# Patient Record
Sex: MALE | Race: WHITE | NOT HISPANIC OR LATINO | Employment: FULL TIME | URBAN - NONMETROPOLITAN AREA
[De-identification: names, ages, dates, MRNs, and addresses within clinical notes are randomized per-mention and may not be internally consistent; named-entity substitution may affect disease eponyms.]

---

## 2019-01-14 ENCOUNTER — TRANSCRIBE ORDERS (OUTPATIENT)
Dept: LAB | Facility: CLINIC | Age: 49
End: 2019-01-14

## 2019-01-14 ENCOUNTER — APPOINTMENT (OUTPATIENT)
Dept: LAB | Facility: CLINIC | Age: 49
End: 2019-01-14
Payer: COMMERCIAL

## 2019-01-14 DIAGNOSIS — M25.50 ARTHRALGIA, UNSPECIFIED JOINT: ICD-10-CM

## 2019-01-14 DIAGNOSIS — I49.9 CARDIAC ARRHYTHMIA, UNSPECIFIED CARDIAC ARRHYTHMIA TYPE: Primary | ICD-10-CM

## 2019-01-14 DIAGNOSIS — I49.9 CARDIAC ARRHYTHMIA, UNSPECIFIED CARDIAC ARRHYTHMIA TYPE: ICD-10-CM

## 2019-01-14 DIAGNOSIS — E13.8 DIABETES MELLITUS OF OTHER TYPE WITH COMPLICATION, UNSPECIFIED WHETHER LONG TERM INSULIN USE: ICD-10-CM

## 2019-01-14 DIAGNOSIS — I10 HYPERTENSION, UNSPECIFIED TYPE: ICD-10-CM

## 2019-01-14 LAB
ALBUMIN SERPL BCP-MCNC: 3.8 G/DL (ref 3.5–5)
ALP SERPL-CCNC: 115 U/L (ref 46–116)
ALT SERPL W P-5'-P-CCNC: 124 U/L (ref 12–78)
ANION GAP SERPL CALCULATED.3IONS-SCNC: 6 MMOL/L (ref 4–13)
AST SERPL W P-5'-P-CCNC: 72 U/L (ref 5–45)
BILIRUB SERPL-MCNC: 0.67 MG/DL (ref 0.2–1)
BUN SERPL-MCNC: 14 MG/DL (ref 5–25)
CALCIUM SERPL-MCNC: 8.7 MG/DL (ref 8.3–10.1)
CHLORIDE SERPL-SCNC: 101 MMOL/L (ref 100–108)
CHOLEST SERPL-MCNC: 157 MG/DL (ref 50–200)
CO2 SERPL-SCNC: 28 MMOL/L (ref 21–32)
CREAT SERPL-MCNC: 0.88 MG/DL (ref 0.6–1.3)
ERYTHROCYTE [DISTWIDTH] IN BLOOD BY AUTOMATED COUNT: 13.3 % (ref 11.6–15.1)
EST. AVERAGE GLUCOSE BLD GHB EST-MCNC: 220 MG/DL
GFR SERPL CREATININE-BSD FRML MDRD: 102 ML/MIN/1.73SQ M
GLUCOSE P FAST SERPL-MCNC: 186 MG/DL (ref 65–99)
HBA1C MFR BLD: 9.3 % (ref 4.2–6.3)
HCT VFR BLD AUTO: 52.2 % (ref 36.5–49.3)
HDLC SERPL-MCNC: 22 MG/DL (ref 40–60)
HGB BLD-MCNC: 17 G/DL (ref 12–17)
LDLC SERPL CALC-MCNC: 98 MG/DL (ref 0–100)
MCH RBC QN AUTO: 30.5 PG (ref 26.8–34.3)
MCHC RBC AUTO-ENTMCNC: 32.6 G/DL (ref 31.4–37.4)
MCV RBC AUTO: 94 FL (ref 82–98)
NONHDLC SERPL-MCNC: 135 MG/DL
PLATELET # BLD AUTO: 121 THOUSANDS/UL (ref 149–390)
PMV BLD AUTO: 13.9 FL (ref 8.9–12.7)
POTASSIUM SERPL-SCNC: 4 MMOL/L (ref 3.5–5.3)
PROT SERPL-MCNC: 7.7 G/DL (ref 6.4–8.2)
RBC # BLD AUTO: 5.57 MILLION/UL (ref 3.88–5.62)
SODIUM SERPL-SCNC: 135 MMOL/L (ref 136–145)
T4 SERPL-MCNC: 7.6 UG/DL (ref 4.7–13.3)
TRIGL SERPL-MCNC: 185 MG/DL
TSH SERPL DL<=0.05 MIU/L-ACNC: 6.57 UIU/ML (ref 0.36–3.74)
WBC # BLD AUTO: 9.33 THOUSAND/UL (ref 4.31–10.16)

## 2019-01-14 PROCEDURE — 80061 LIPID PANEL: CPT

## 2019-01-14 PROCEDURE — 84443 ASSAY THYROID STIM HORMONE: CPT

## 2019-01-14 PROCEDURE — 84436 ASSAY OF TOTAL THYROXINE: CPT

## 2019-01-14 PROCEDURE — 36415 COLL VENOUS BLD VENIPUNCTURE: CPT

## 2019-01-14 PROCEDURE — 83036 HEMOGLOBIN GLYCOSYLATED A1C: CPT

## 2019-01-14 PROCEDURE — 80053 COMPREHEN METABOLIC PANEL: CPT

## 2019-01-14 PROCEDURE — 85027 COMPLETE CBC AUTOMATED: CPT

## 2019-03-26 ENCOUNTER — APPOINTMENT (OUTPATIENT)
Dept: RADIOLOGY | Facility: CLINIC | Age: 49
End: 2019-03-26
Payer: COMMERCIAL

## 2019-03-26 ENCOUNTER — OFFICE VISIT (OUTPATIENT)
Dept: URGENT CARE | Facility: CLINIC | Age: 49
End: 2019-03-26
Payer: COMMERCIAL

## 2019-03-26 VITALS
WEIGHT: 315 LBS | DIASTOLIC BLOOD PRESSURE: 68 MMHG | BODY MASS INDEX: 44.1 KG/M2 | TEMPERATURE: 100.6 F | SYSTOLIC BLOOD PRESSURE: 141 MMHG | HEIGHT: 71 IN | OXYGEN SATURATION: 96 % | RESPIRATION RATE: 16 BRPM | HEART RATE: 91 BPM

## 2019-03-26 DIAGNOSIS — R05.9 COUGH: ICD-10-CM

## 2019-03-26 DIAGNOSIS — R50.9 FEVER, UNSPECIFIED FEVER CAUSE: ICD-10-CM

## 2019-03-26 DIAGNOSIS — R06.2 WHEEZING: ICD-10-CM

## 2019-03-26 DIAGNOSIS — R68.89 FLU-LIKE SYMPTOMS: Primary | ICD-10-CM

## 2019-03-26 LAB — S PYO AG THROAT QL: NEGATIVE

## 2019-03-26 PROCEDURE — 87070 CULTURE OTHR SPECIMN AEROBIC: CPT | Performed by: PHYSICIAN ASSISTANT

## 2019-03-26 PROCEDURE — 71046 X-RAY EXAM CHEST 2 VIEWS: CPT

## 2019-03-26 PROCEDURE — 94640 AIRWAY INHALATION TREATMENT: CPT | Performed by: PHYSICIAN ASSISTANT

## 2019-03-26 PROCEDURE — 99214 OFFICE O/P EST MOD 30 MIN: CPT | Performed by: PHYSICIAN ASSISTANT

## 2019-03-26 RX ORDER — FUROSEMIDE 80 MG
80 TABLET ORAL DAILY
Refills: 5 | COMMUNITY
Start: 2019-03-02

## 2019-03-26 RX ORDER — GLIPIZIDE 2.5 MG/1
5 TABLET, EXTENDED RELEASE ORAL
Refills: 1 | COMMUNITY
Start: 2019-03-14

## 2019-03-26 RX ORDER — GLIPIZIDE 2.5 MG/1
2.5 TABLET, EXTENDED RELEASE ORAL
Refills: 3 | COMMUNITY
Start: 2019-03-20

## 2019-03-26 RX ORDER — OSELTAMIVIR PHOSPHATE 75 MG/1
75 CAPSULE ORAL 2 TIMES DAILY
Qty: 10 CAPSULE | Refills: 0 | Status: SHIPPED | OUTPATIENT
Start: 2019-03-26 | End: 2019-04-04 | Stop reason: HOSPADM

## 2019-03-26 RX ORDER — CANAGLIFLOZIN 300 MG/1
TABLET, FILM COATED ORAL
Refills: 5 | COMMUNITY
Start: 2019-03-19

## 2019-03-26 RX ORDER — SITAGLIPTIN AND METFORMIN HYDROCHLORIDE 1000; 50 MG/1; MG/1
TABLET, FILM COATED, EXTENDED RELEASE ORAL
Refills: 5 | COMMUNITY
Start: 2019-03-19

## 2019-03-26 RX ORDER — IPRATROPIUM BROMIDE AND ALBUTEROL SULFATE 2.5; .5 MG/3ML; MG/3ML
3 SOLUTION RESPIRATORY (INHALATION) ONCE
Status: COMPLETED | OUTPATIENT
Start: 2019-03-26 | End: 2019-03-26

## 2019-03-26 RX ORDER — ATORVASTATIN CALCIUM 20 MG/1
20 TABLET, FILM COATED ORAL DAILY
Refills: 3 | COMMUNITY
Start: 2019-03-19

## 2019-03-26 RX ORDER — IRBESARTAN 300 MG/1
TABLET ORAL
COMMUNITY
Start: 2019-03-19

## 2019-03-26 RX ORDER — PREDNISONE 10 MG/1
TABLET ORAL
Qty: 26 TABLET | Refills: 0 | Status: SHIPPED | OUTPATIENT
Start: 2019-03-26 | End: 2019-04-04 | Stop reason: HOSPADM

## 2019-03-26 RX ADMIN — IPRATROPIUM BROMIDE AND ALBUTEROL SULFATE 3 ML: 2.5; .5 SOLUTION RESPIRATORY (INHALATION) at 19:36

## 2019-03-26 NOTE — PROGRESS NOTES
[address] [facility]    NAME: Umu Rios is a 50 y o  male  : 1970    MRN: [id number]  DATE: 2019  TIME: 7:13 PM    Assessment and Plan   Flu-like symptoms [R68 89]  1  Flu-like symptoms  oseltamivir (TAMIFLU) 75 mg capsule   2  Cough  XR chest pa & lateral    ipratropium-albuterol (DUO-NEB) 0 5-2 5 mg/3 mL inhalation solution 3 mL   3  Fever, unspecified fever cause  POCT rapid strepA    Throat culture   4  Wheezing  predniSONE 10 mg tablet       Patient Instructions     Patient Instructions   Symptoms are flu-like  Will treat with Tamiflu  Take prednisone for wheezing  Take as directed  Ibuprofen and/or Tylenol to help with fever and body aches  If Fever is lasting longer than 4-5 days, get recheck by her PCP  If you worsening shortness of breath go to the emergency room  Infection appears viral   Recommend symptomatic treatment  Over the counter cough and cold medications to help with symptoms  Use salt water gargles for sore throat and throat lozenges  Cough drops as needed  Wash hands frequently to prevent the spread of infection  If not improving over the next 7-10 days, follow up with PCP  Symptoms may persist for 10-14 days  Patient advised to monitor blood sugars  Chief Complaint     Chief Complaint   Patient presents with    Flu Symptoms     x 1 day       History of Present Illness   26-year-old male here with flu-like symptoms since yesterday  Has a high fever  Slight cough and some wheezing  Some mild nasal congestion  Very tired and has not gotten out of bed  Review of Systems   Review of Systems   Constitutional: Positive for chills, fatigue and fever  Negative for activity change, appetite change, diaphoresis and unexpected weight change  HENT: Positive for congestion  Negative for ear pain, hearing loss, sinus pressure, sneezing, sore throat and tinnitus  Eyes: Negative for photophobia, redness and visual disturbance     Respiratory: Positive for cough and wheezing  Negative for apnea, chest tightness, shortness of breath and stridor  Cardiovascular: Negative for chest pain, palpitations and leg swelling  Gastrointestinal: Negative for abdominal distention, abdominal pain, blood in stool, constipation, diarrhea, nausea and vomiting  Endocrine: Negative for cold intolerance, heat intolerance, polydipsia, polyphagia and polyuria  Genitourinary: Negative for difficulty urinating, dysuria, flank pain, hematuria and urgency  Musculoskeletal: Positive for myalgias  Negative for arthralgias, back pain, gait problem, neck pain and neck stiffness  Skin: Negative for rash and wound  Neurological: Negative for dizziness, tremors, seizures, syncope, weakness, light-headedness, numbness and headaches  Hematological: Negative for adenopathy  Does not bruise/bleed easily  Psychiatric/Behavioral: Negative for agitation, behavioral problems, confusion and decreased concentration  The patient is not nervous/anxious  All other systems reviewed and are negative  Current Medications     Current Outpatient Medications:     atorvastatin (LIPITOR) 20 mg tablet, Take 20 mg by mouth daily, Disp: , Rfl: 3    furosemide (LASIX) 80 mg tablet, Take 80 mg by mouth daily, Disp: , Rfl: 5    glipiZIDE (GLUCOTROL XL) 2 5 mg 24 hr tablet, Take 2 5 mg by mouth daily with breakfast, Disp: , Rfl: 1    glipiZIDE (GLUCOTROL XL) 5 mg 24 hr tablet, Take 5 mg by mouth daily with breakfast, Disp: , Rfl: 3    INVOKANA 300 MG TABS, TAKE 1 TABLET BY ORAL ROUTE EVERY DAY BEFORE THE FIRST MEAL OF THE DAY, Disp: , Rfl: 5    irbesartan (AVAPRO) 300 mg tablet, , Disp: , Rfl:     JANUMET XR  MG TB24, TAKE 2 TABLETS BY MOUTH DAILY WITH EVENING MEAL   SWALLOW WHOLE  DO NOT CRUSH,CHEW AND / OR DIVIDE, Disp: , Rfl: 5    sertraline (ZOLOFT) 50 mg tablet, Take 50 mg by mouth daily, Disp: , Rfl: 3    oseltamivir (TAMIFLU) 75 mg capsule, Take 1 capsule (75 mg total) by mouth 2 (two) times a day for 5 days, Disp: 10 capsule, Rfl: 0    predniSONE 10 mg tablet, Take 3 tabs BID X 2 days, 2 tabs BID X 2 days, 1 tab BID X 2 days, 1 tab daily X 2 days, Disp: 26 tablet, Rfl: 0    Current Facility-Administered Medications:     ipratropium-albuterol (DUO-NEB) 0 5-2 5 mg/3 mL inhalation solution 3 mL, 3 mL, Nebulization, Once, Nallely Norwood PA-C    Current Allergies     Allergies as of 03/26/2019 - Reviewed 03/26/2019   Allergen Reaction Noted    Penicillins  03/26/2019          The following portions of the patient's history were reviewed and updated as appropriate: allergies, current medications, past family history, past medical history, past social history, past surgical history and problem list    Past Medical History:   Diagnosis Date    Anxiety     Depression     Diabetes mellitus ([facility] [address] )     Hyperlipidemia     Hypertension      History reviewed  No pertinent surgical history  History reviewed  No pertinent family history    Social History     Socioeconomic History    Marital status: /Civil Union     Spouse name: Not on file    Number of children: Not on file    Years of education: Not on file    Highest education level: Not on file   Occupational History    Not on file   Social Needs    Financial resource strain: Not on file    Food insecurity:     Worry: Not on file     Inability: Not on file    Transportation needs:     Medical: Not on file     Non-medical: Not on file   Tobacco Use    Smoking status: Not on file   Substance and Sexual Activity    Alcohol use: Not on file    Drug use: Not on file    Sexual activity: Not on file   Lifestyle    Physical activity:     Days per week: Not on file     Minutes per session: Not on file    Stress: Not on file   Relationships    Social connections:     Talks on phone: Not on file     Gets together: Not on file     Attends Hindu service: Not on file     Active member of club or organization: Not on file     Attends meetings of clubs or organizations: Not on file     Relationship status: Not on file    Intimate partner violence:     Fear of current or ex partner: Not on file     Emotionally abused: Not on file     Physically abused: Not on file     Forced sexual activity: Not on file   Other Topics Concern    Not on file   Social History Narrative    Not on file     Medications have been verified  Objective   /68   Pulse 91   Temp (!) 100 6 °F (38 1 °C)   Resp 16   Ht 5' 11" (1 803 m)   Wt (!) 150 kg (330 lb)   SpO2 96%   BMI 46 03 kg/m²      Physical Exam   Physical Exam   Constitutional: He appears well-developed and well-nourished  No distress  HENT:   Head: Normocephalic  Right Ear: Tympanic membrane and external ear normal    Left Ear: Tympanic membrane and external ear normal    Nose: Mucosal edema present  Mouth/Throat: Posterior oropharyngeal erythema present  No oropharyngeal exudate  Neck: Normal range of motion  Neck supple  Cardiovascular: Normal rate, regular rhythm and normal heart sounds  No murmur heard  Pulmonary/Chest: Effort normal  No respiratory distress  He has wheezes  He has no rhonchi  He has no rales  Abdominal: Soft  Bowel sounds are normal  There is no tenderness  Musculoskeletal: Normal range of motion  Lymphadenopathy:     He has no cervical adenopathy  Skin: Skin is warm  No rash noted  Nursing note and vitals reviewed

## 2019-03-29 LAB — BACTERIA THROAT CULT: NORMAL

## 2019-03-30 ENCOUNTER — APPOINTMENT (EMERGENCY)
Dept: RADIOLOGY | Facility: HOSPITAL | Age: 49
DRG: 872 | End: 2019-03-30
Payer: COMMERCIAL

## 2019-03-30 ENCOUNTER — HOSPITAL ENCOUNTER (INPATIENT)
Facility: HOSPITAL | Age: 49
LOS: 5 days | Discharge: HOME | DRG: 872 | End: 2019-04-04
Attending: EMERGENCY MEDICINE | Admitting: HOSPITALIST
Payer: COMMERCIAL

## 2019-03-30 DIAGNOSIS — E11.65 HYPERGLYCEMIA DUE TO TYPE 2 DIABETES MELLITUS (HCC): ICD-10-CM

## 2019-03-30 DIAGNOSIS — L03.115 CELLULITIS OF RIGHT LOWER EXTREMITY: ICD-10-CM

## 2019-03-30 DIAGNOSIS — Z72.0 TOBACCO ABUSE: Chronic | ICD-10-CM

## 2019-03-30 DIAGNOSIS — A41.9 SEPSIS AFFECTING SKIN: Primary | ICD-10-CM

## 2019-03-30 DIAGNOSIS — E87.1 HYPONATREMIA: ICD-10-CM

## 2019-03-30 DIAGNOSIS — L03.115 CELLULITIS OF LEG, RIGHT: ICD-10-CM

## 2019-03-30 DIAGNOSIS — R78.81 GRAM-POSITIVE BACTEREMIA: ICD-10-CM

## 2019-03-30 DIAGNOSIS — A41.9 SEPSIS, DUE TO UNSPECIFIED ORGANISM: ICD-10-CM

## 2019-03-30 PROBLEM — E11.8 TYPE 2 DIABETES MELLITUS WITH COMPLICATION, WITHOUT LONG-TERM CURRENT USE OF INSULIN (HCC): Chronic | Status: ACTIVE | Noted: 2019-03-30

## 2019-03-30 PROBLEM — E66.01 MORBID OBESITY WITH BMI OF 40.0-44.9, ADULT (HCC): Chronic | Status: ACTIVE | Noted: 2019-03-30

## 2019-03-30 PROBLEM — I10 ESSENTIAL HYPERTENSION: Chronic | Status: ACTIVE | Noted: 2019-03-30

## 2019-03-30 LAB
ALBUMIN SERPL BCP-MCNC: 3.5 G/DL (ref 3.5–5.7)
ALP SERPL-CCNC: 98 U/L (ref 40–150)
ALT SERPL W P-5'-P-CCNC: 33 U/L (ref 7–52)
ANION GAP SERPL CALCULATED.3IONS-SCNC: 13 MMOL/L (ref 4–13)
APTT PPP: 36 SECONDS (ref 26–38)
AST SERPL W P-5'-P-CCNC: 22 U/L (ref 13–39)
BACTERIA UR QL AUTO: ABNORMAL /HPF
BASOPHILS # BLD AUTO: 0 THOUSANDS/ΜL (ref 0–0.1)
BASOPHILS NFR BLD AUTO: 0 % (ref 0–2)
BILIRUB SERPL-MCNC: 1 MG/DL (ref 0.2–1)
BILIRUB UR QL STRIP: ABNORMAL
BUN SERPL-MCNC: 17 MG/DL (ref 7–25)
CALCIUM SERPL-MCNC: 9.1 MG/DL (ref 8.6–10.5)
CHLORIDE SERPL-SCNC: 96 MMOL/L (ref 98–107)
CLARITY UR: CLEAR
CO2 SERPL-SCNC: 21 MMOL/L (ref 21–31)
COARSE GRAN CASTS URNS QL MICRO: ABNORMAL /LPF
COLOR UR: YELLOW
CREAT SERPL-MCNC: 0.76 MG/DL (ref 0.7–1.3)
EOSINOPHIL # BLD AUTO: 0 THOUSAND/ΜL (ref 0–0.61)
EOSINOPHIL NFR BLD AUTO: 0 % (ref 0–5)
ERYTHROCYTE [DISTWIDTH] IN BLOOD BY AUTOMATED COUNT: 14 % (ref 11.5–14.5)
FINE GRAN CASTS URNS QL MICRO: ABNORMAL /LPF
GFR SERPL CREATININE-BSD FRML MDRD: 108 ML/MIN/1.73SQ M
GLUCOSE SERPL-MCNC: 114 MG/DL (ref 65–140)
GLUCOSE SERPL-MCNC: 161 MG/DL (ref 65–99)
GLUCOSE SERPL-MCNC: 99 MG/DL (ref 65–140)
GLUCOSE UR STRIP-MCNC: ABNORMAL MG/DL
HCT VFR BLD AUTO: 47 % (ref 42–47)
HGB BLD-MCNC: 15.9 G/DL (ref 14–18)
HGB UR QL STRIP.AUTO: NEGATIVE
HYALINE CASTS #/AREA URNS LPF: ABNORMAL /LPF
INR PPP: 1.15 (ref 0.9–1.5)
KETONES UR STRIP-MCNC: ABNORMAL MG/DL
LACTATE SERPL-SCNC: 1.1 MMOL/L (ref 0.5–2)
LACTATE SERPL-SCNC: 1.5 MMOL/L (ref 0.5–2)
LEUKOCYTE ESTERASE UR QL STRIP: NEGATIVE
LYMPHOCYTES # BLD AUTO: 1.2 THOUSANDS/ΜL (ref 0.6–4.47)
LYMPHOCYTES NFR BLD AUTO: 8 % (ref 21–51)
MCH RBC QN AUTO: 30.8 PG (ref 26–34)
MCHC RBC AUTO-ENTMCNC: 33.9 G/DL (ref 31–37)
MCV RBC AUTO: 91 FL (ref 81–99)
MONOCYTES # BLD AUTO: 1.6 THOUSAND/ΜL (ref 0.17–1.22)
MONOCYTES NFR BLD AUTO: 10 % (ref 2–12)
NEUTROPHILS # BLD AUTO: 12.9 THOUSANDS/ΜL (ref 1.4–6.5)
NEUTS SEG NFR BLD AUTO: 82 % (ref 42–75)
NITRITE UR QL STRIP: NEGATIVE
NON-SQ EPI CELLS URNS QL MICRO: ABNORMAL /HPF
PH UR STRIP.AUTO: 5.5 [PH]
PLATELET # BLD AUTO: 152 THOUSANDS/UL (ref 149–390)
PMV BLD AUTO: 10 FL (ref 8.6–11.7)
POTASSIUM SERPL-SCNC: 3.7 MMOL/L (ref 3.5–5.5)
PROT SERPL-MCNC: 7.3 G/DL (ref 6.4–8.9)
PROT UR STRIP-MCNC: ABNORMAL MG/DL
PROTHROMBIN TIME: 13.4 SECONDS (ref 10.2–13)
RBC # BLD AUTO: 5.17 MILLION/UL (ref 4.3–5.9)
RBC #/AREA URNS AUTO: ABNORMAL /HPF
SODIUM SERPL-SCNC: 130 MMOL/L (ref 134–143)
SP GR UR STRIP.AUTO: 1.02 (ref 1–1.03)
UROBILINOGEN UR QL STRIP.AUTO: 1 E.U./DL
WBC # BLD AUTO: 15.7 THOUSAND/UL (ref 4.8–10.8)
WBC #/AREA URNS AUTO: ABNORMAL /HPF

## 2019-03-30 PROCEDURE — 80053 COMPREHEN METABOLIC PANEL: CPT | Performed by: EMERGENCY MEDICINE

## 2019-03-30 PROCEDURE — 81001 URINALYSIS AUTO W/SCOPE: CPT | Performed by: EMERGENCY MEDICINE

## 2019-03-30 PROCEDURE — 36415 COLL VENOUS BLD VENIPUNCTURE: CPT | Performed by: EMERGENCY MEDICINE

## 2019-03-30 PROCEDURE — 99284 EMERGENCY DEPT VISIT MOD MDM: CPT

## 2019-03-30 PROCEDURE — 84145 PROCALCITONIN (PCT): CPT | Performed by: EMERGENCY MEDICINE

## 2019-03-30 PROCEDURE — 87147 CULTURE TYPE IMMUNOLOGIC: CPT | Performed by: EMERGENCY MEDICINE

## 2019-03-30 PROCEDURE — 99223 1ST HOSP IP/OBS HIGH 75: CPT | Performed by: PHYSICIAN ASSISTANT

## 2019-03-30 PROCEDURE — 85025 COMPLETE CBC W/AUTO DIFF WBC: CPT | Performed by: EMERGENCY MEDICINE

## 2019-03-30 PROCEDURE — 85730 THROMBOPLASTIN TIME PARTIAL: CPT | Performed by: EMERGENCY MEDICINE

## 2019-03-30 PROCEDURE — 85610 PROTHROMBIN TIME: CPT | Performed by: EMERGENCY MEDICINE

## 2019-03-30 PROCEDURE — 83605 ASSAY OF LACTIC ACID: CPT | Performed by: EMERGENCY MEDICINE

## 2019-03-30 PROCEDURE — 82948 REAGENT STRIP/BLOOD GLUCOSE: CPT

## 2019-03-30 PROCEDURE — 71045 X-RAY EXAM CHEST 1 VIEW: CPT

## 2019-03-30 PROCEDURE — 96375 TX/PRO/DX INJ NEW DRUG ADDON: CPT

## 2019-03-30 PROCEDURE — 81003 URINALYSIS AUTO W/O SCOPE: CPT | Performed by: EMERGENCY MEDICINE

## 2019-03-30 PROCEDURE — 87040 BLOOD CULTURE FOR BACTERIA: CPT | Performed by: EMERGENCY MEDICINE

## 2019-03-30 PROCEDURE — 96365 THER/PROPH/DIAG IV INF INIT: CPT

## 2019-03-30 RX ORDER — 0.9 % SODIUM CHLORIDE 0.9 %
3 VIAL (ML) INJECTION AS NEEDED
Status: DISCONTINUED | OUTPATIENT
Start: 2019-03-30 | End: 2019-04-04 | Stop reason: HOSPADM

## 2019-03-30 RX ORDER — INSULIN GLARGINE 100 [IU]/ML
30 INJECTION, SOLUTION SUBCUTANEOUS
Status: DISCONTINUED | OUTPATIENT
Start: 2019-03-30 | End: 2019-03-31

## 2019-03-30 RX ORDER — OXYCODONE HYDROCHLORIDE AND ACETAMINOPHEN 5; 325 MG/1; MG/1
2 TABLET ORAL EVERY 4 HOURS PRN
Status: DISCONTINUED | OUTPATIENT
Start: 2019-03-30 | End: 2019-04-04 | Stop reason: HOSPADM

## 2019-03-30 RX ORDER — SERTRALINE HYDROCHLORIDE 25 MG/1
50 TABLET, FILM COATED ORAL DAILY
Status: DISCONTINUED | OUTPATIENT
Start: 2019-03-31 | End: 2019-04-04 | Stop reason: HOSPADM

## 2019-03-30 RX ORDER — OSELTAMIVIR PHOSPHATE 75 MG/1
75 CAPSULE ORAL 2 TIMES DAILY
Status: DISCONTINUED | OUTPATIENT
Start: 2019-03-30 | End: 2019-03-30

## 2019-03-30 RX ORDER — CEFTRIAXONE 2 G/50ML
2000 INJECTION, SOLUTION INTRAVENOUS EVERY 24 HOURS
Status: DISCONTINUED | OUTPATIENT
Start: 2019-03-30 | End: 2019-04-01

## 2019-03-30 RX ORDER — ATORVASTATIN CALCIUM 20 MG/1
20 TABLET, FILM COATED ORAL DAILY
Status: DISCONTINUED | OUTPATIENT
Start: 2019-03-31 | End: 2019-04-04 | Stop reason: HOSPADM

## 2019-03-30 RX ORDER — CEFTRIAXONE 2 G/50ML
2000 INJECTION, SOLUTION INTRAVENOUS EVERY 24 HOURS
Status: DISCONTINUED | OUTPATIENT
Start: 2019-03-31 | End: 2019-03-30

## 2019-03-30 RX ORDER — LOSARTAN POTASSIUM 50 MG/1
100 TABLET ORAL DAILY
Status: DISCONTINUED | OUTPATIENT
Start: 2019-03-31 | End: 2019-04-04 | Stop reason: HOSPADM

## 2019-03-30 RX ORDER — PREDNISONE 20 MG/1
20 TABLET ORAL DAILY
Status: COMPLETED | OUTPATIENT
Start: 2019-03-31 | End: 2019-03-31

## 2019-03-30 RX ORDER — ACETAMINOPHEN 325 MG/1
650 TABLET ORAL EVERY 4 HOURS PRN
Status: DISCONTINUED | OUTPATIENT
Start: 2019-03-30 | End: 2019-04-04 | Stop reason: HOSPADM

## 2019-03-30 RX ORDER — NICOTINE 21 MG/24HR
1 PATCH, TRANSDERMAL 24 HOURS TRANSDERMAL DAILY
Status: DISCONTINUED | OUTPATIENT
Start: 2019-03-30 | End: 2019-04-04 | Stop reason: HOSPADM

## 2019-03-30 RX ORDER — FUROSEMIDE 80 MG
80 TABLET ORAL DAILY
Status: DISCONTINUED | OUTPATIENT
Start: 2019-03-31 | End: 2019-03-31

## 2019-03-30 RX ORDER — ONDANSETRON 2 MG/ML
4 INJECTION INTRAMUSCULAR; INTRAVENOUS EVERY 6 HOURS PRN
Status: DISCONTINUED | OUTPATIENT
Start: 2019-03-30 | End: 2019-04-04 | Stop reason: HOSPADM

## 2019-03-30 RX ORDER — OSELTAMIVIR PHOSPHATE 75 MG/1
75 CAPSULE ORAL 2 TIMES DAILY
Status: COMPLETED | OUTPATIENT
Start: 2019-03-31 | End: 2019-03-31

## 2019-03-30 RX ORDER — HYDROMORPHONE HCL/PF 1 MG/ML
1 SYRINGE (ML) INJECTION ONCE
Status: COMPLETED | OUTPATIENT
Start: 2019-03-30 | End: 2019-03-30

## 2019-03-30 RX ORDER — PREDNISONE 10 MG/1
10 TABLET ORAL DAILY
Status: COMPLETED | OUTPATIENT
Start: 2019-04-01 | End: 2019-04-02

## 2019-03-30 RX ORDER — KETOROLAC TROMETHAMINE 30 MG/ML
30 INJECTION, SOLUTION INTRAMUSCULAR; INTRAVENOUS ONCE
Status: COMPLETED | OUTPATIENT
Start: 2019-03-30 | End: 2019-03-30

## 2019-03-30 RX ADMIN — KETOROLAC TROMETHAMINE 30 MG: 30 INJECTION, SOLUTION INTRAMUSCULAR; INTRAVENOUS at 13:52

## 2019-03-30 RX ADMIN — GENTAMICIN SULFATE 380 MG: 40 INJECTION, SOLUTION INTRAMUSCULAR; INTRAVENOUS at 13:52

## 2019-03-30 RX ADMIN — INSULIN GLARGINE 30 UNITS: 100 INJECTION, SOLUTION SUBCUTANEOUS at 21:50

## 2019-03-30 RX ADMIN — HYDROMORPHONE HYDROCHLORIDE 1 MG: 1 INJECTION, SOLUTION INTRAMUSCULAR; INTRAVENOUS; SUBCUTANEOUS at 13:52

## 2019-03-30 RX ADMIN — CEFTRIAXONE 2000 MG: 2 INJECTION, SOLUTION INTRAVENOUS at 20:36

## 2019-03-30 RX ADMIN — OSELTAMIVIR PHOSPHATE 75 MG: 75 CAPSULE ORAL at 17:14

## 2019-03-30 RX ADMIN — ENOXAPARIN SODIUM 40 MG: 40 INJECTION SUBCUTANEOUS at 16:24

## 2019-03-30 RX ADMIN — NICOTINE 1 PATCH: 21 PATCH, EXTENDED RELEASE TRANSDERMAL at 16:24

## 2019-03-30 RX ADMIN — VANCOMYCIN HYDROCHLORIDE 2000 MG: 1 INJECTION, POWDER, LYOPHILIZED, FOR SOLUTION INTRAVENOUS at 14:53

## 2019-03-30 RX ADMIN — SODIUM CHLORIDE 1000 ML: 0.9 INJECTION, SOLUTION INTRAVENOUS at 15:08

## 2019-03-30 RX ADMIN — SODIUM CHLORIDE 1000 ML: 0.9 INJECTION, SOLUTION INTRAVENOUS at 13:43

## 2019-03-31 ENCOUNTER — APPOINTMENT (INPATIENT)
Dept: CT IMAGING | Facility: HOSPITAL | Age: 49
DRG: 872 | End: 2019-03-31
Payer: COMMERCIAL

## 2019-03-31 PROBLEM — R78.81 GRAM-POSITIVE BACTEREMIA: Status: ACTIVE | Noted: 2019-03-31

## 2019-03-31 PROBLEM — E87.6 HYPOKALEMIA: Status: ACTIVE | Noted: 2019-03-31

## 2019-03-31 LAB
ANION GAP SERPL CALCULATED.3IONS-SCNC: 10 MMOL/L (ref 4–13)
BUN SERPL-MCNC: 22 MG/DL (ref 7–25)
CALCIUM SERPL-MCNC: 8.4 MG/DL (ref 8.6–10.5)
CHLORIDE SERPL-SCNC: 98 MMOL/L (ref 98–107)
CO2 SERPL-SCNC: 22 MMOL/L (ref 21–31)
CREAT SERPL-MCNC: 0.94 MG/DL (ref 0.7–1.3)
ERYTHROCYTE [DISTWIDTH] IN BLOOD BY AUTOMATED COUNT: 14 % (ref 11.5–14.5)
GFR SERPL CREATININE-BSD FRML MDRD: 95 ML/MIN/1.73SQ M
GLUCOSE SERPL-MCNC: 146 MG/DL (ref 65–140)
GLUCOSE SERPL-MCNC: 158 MG/DL (ref 65–140)
GLUCOSE SERPL-MCNC: 68 MG/DL (ref 65–140)
GLUCOSE SERPL-MCNC: 85 MG/DL (ref 65–140)
GLUCOSE SERPL-MCNC: 87 MG/DL (ref 65–99)
HCT VFR BLD AUTO: 42.8 % (ref 42–47)
HGB BLD-MCNC: 14.5 G/DL (ref 14–18)
MCH RBC QN AUTO: 30.3 PG (ref 26–34)
MCHC RBC AUTO-ENTMCNC: 33.9 G/DL (ref 31–37)
MCV RBC AUTO: 90 FL (ref 81–99)
PLATELET # BLD AUTO: 139 THOUSANDS/UL (ref 149–390)
PMV BLD AUTO: 10.6 FL (ref 8.6–11.7)
POTASSIUM SERPL-SCNC: 3.2 MMOL/L (ref 3.5–5.5)
PROCALCITONIN SERPL-MCNC: 7.49 NG/ML
RBC # BLD AUTO: 4.78 MILLION/UL (ref 4.3–5.9)
SODIUM SERPL-SCNC: 130 MMOL/L (ref 134–143)
WBC # BLD AUTO: 12.9 THOUSAND/UL (ref 4.8–10.8)

## 2019-03-31 PROCEDURE — 82948 REAGENT STRIP/BLOOD GLUCOSE: CPT

## 2019-03-31 PROCEDURE — 99232 SBSQ HOSP IP/OBS MODERATE 35: CPT | Performed by: PHYSICIAN ASSISTANT

## 2019-03-31 PROCEDURE — 85027 COMPLETE CBC AUTOMATED: CPT | Performed by: PHYSICIAN ASSISTANT

## 2019-03-31 PROCEDURE — 87631 RESP VIRUS 3-5 TARGETS: CPT | Performed by: NURSE PRACTITIONER

## 2019-03-31 PROCEDURE — 80048 BASIC METABOLIC PNL TOTAL CA: CPT | Performed by: PHYSICIAN ASSISTANT

## 2019-03-31 PROCEDURE — 73701 CT LOWER EXTREMITY W/DYE: CPT

## 2019-03-31 RX ORDER — FUROSEMIDE 80 MG
80 TABLET ORAL DAILY
Status: DISCONTINUED | OUTPATIENT
Start: 2019-04-03 | End: 2019-04-04 | Stop reason: HOSPADM

## 2019-03-31 RX ORDER — KETOROLAC TROMETHAMINE 30 MG/ML
30 INJECTION, SOLUTION INTRAMUSCULAR; INTRAVENOUS EVERY 6 HOURS SCHEDULED
Status: DISPENSED | OUTPATIENT
Start: 2019-03-31 | End: 2019-04-02

## 2019-03-31 RX ORDER — INSULIN GLARGINE 100 [IU]/ML
25 INJECTION, SOLUTION SUBCUTANEOUS
Status: DISCONTINUED | OUTPATIENT
Start: 2019-03-31 | End: 2019-04-04 | Stop reason: HOSPADM

## 2019-03-31 RX ORDER — SODIUM CHLORIDE 9 MG/ML
125 INJECTION, SOLUTION INTRAVENOUS CONTINUOUS
Status: DISCONTINUED | OUTPATIENT
Start: 2019-03-31 | End: 2019-03-31

## 2019-03-31 RX ORDER — HYDROMORPHONE HCL/PF 1 MG/ML
1 SYRINGE (ML) INJECTION EVERY 4 HOURS PRN
Status: DISCONTINUED | OUTPATIENT
Start: 2019-03-31 | End: 2019-04-04 | Stop reason: HOSPADM

## 2019-03-31 RX ORDER — POTASSIUM CHLORIDE 20 MEQ/1
40 TABLET, EXTENDED RELEASE ORAL ONCE
Status: COMPLETED | OUTPATIENT
Start: 2019-03-31 | End: 2019-03-31

## 2019-03-31 RX ORDER — FUROSEMIDE 10 MG/ML
80 INJECTION INTRAMUSCULAR; INTRAVENOUS DAILY
Status: COMPLETED | OUTPATIENT
Start: 2019-04-01 | End: 2019-04-02

## 2019-03-31 RX ADMIN — OSELTAMIVIR PHOSPHATE 75 MG: 75 CAPSULE ORAL at 08:14

## 2019-03-31 RX ADMIN — ATORVASTATIN CALCIUM 20 MG: 20 TABLET, FILM COATED ORAL at 08:11

## 2019-03-31 RX ADMIN — HYDROMORPHONE HYDROCHLORIDE 1 MG: 1 INJECTION, SOLUTION INTRAMUSCULAR; INTRAVENOUS; SUBCUTANEOUS at 14:43

## 2019-03-31 RX ADMIN — KETOROLAC TROMETHAMINE 30 MG: 30 INJECTION, SOLUTION INTRAMUSCULAR; INTRAVENOUS at 11:08

## 2019-03-31 RX ADMIN — SODIUM CHLORIDE 125 ML/HR: 9 INJECTION, SOLUTION INTRAVENOUS at 10:02

## 2019-03-31 RX ADMIN — PREDNISONE 20 MG: 20 TABLET ORAL at 08:14

## 2019-03-31 RX ADMIN — KETOROLAC TROMETHAMINE 30 MG: 30 INJECTION, SOLUTION INTRAMUSCULAR; INTRAVENOUS at 17:53

## 2019-03-31 RX ADMIN — VANCOMYCIN HYDROCHLORIDE 2000 MG: 1 INJECTION, POWDER, LYOPHILIZED, FOR SOLUTION INTRAVENOUS at 03:30

## 2019-03-31 RX ADMIN — INSULIN LISPRO 15 UNITS: 100 INJECTION, SOLUTION INTRAVENOUS; SUBCUTANEOUS at 10:02

## 2019-03-31 RX ADMIN — OXYCODONE HYDROCHLORIDE AND ACETAMINOPHEN 2 TABLET: 5; 325 TABLET ORAL at 08:21

## 2019-03-31 RX ADMIN — CEFTRIAXONE 2000 MG: 2 INJECTION, SOLUTION INTRAVENOUS at 20:24

## 2019-03-31 RX ADMIN — SERTRALINE HYDROCHLORIDE 50 MG: 25 TABLET, FILM COATED ORAL at 08:14

## 2019-03-31 RX ADMIN — INSULIN LISPRO 5 UNITS: 100 INJECTION, SOLUTION INTRAVENOUS; SUBCUTANEOUS at 16:12

## 2019-03-31 RX ADMIN — IOHEXOL 100 ML: 350 INJECTION, SOLUTION INTRAVENOUS at 12:18

## 2019-03-31 RX ADMIN — LOSARTAN POTASSIUM 100 MG: 50 TABLET, FILM COATED ORAL at 08:13

## 2019-03-31 RX ADMIN — OSELTAMIVIR PHOSPHATE 75 MG: 75 CAPSULE ORAL at 17:53

## 2019-03-31 RX ADMIN — VANCOMYCIN HYDROCHLORIDE 2000 MG: 1 INJECTION, POWDER, LYOPHILIZED, FOR SOLUTION INTRAVENOUS at 14:43

## 2019-03-31 RX ADMIN — FUROSEMIDE 80 MG: 80 TABLET ORAL at 08:12

## 2019-03-31 RX ADMIN — NICOTINE 1 PATCH: 21 PATCH, EXTENDED RELEASE TRANSDERMAL at 08:13

## 2019-03-31 RX ADMIN — INSULIN GLARGINE 25 UNITS: 100 INJECTION, SOLUTION SUBCUTANEOUS at 21:44

## 2019-03-31 RX ADMIN — HYDROMORPHONE HYDROCHLORIDE 1 MG: 1 INJECTION, SOLUTION INTRAMUSCULAR; INTRAVENOUS; SUBCUTANEOUS at 10:27

## 2019-03-31 RX ADMIN — ENOXAPARIN SODIUM 40 MG: 40 INJECTION SUBCUTANEOUS at 08:11

## 2019-03-31 RX ADMIN — POTASSIUM CHLORIDE 40 MEQ: 1500 TABLET, EXTENDED RELEASE ORAL at 11:09

## 2019-04-01 ENCOUNTER — APPOINTMENT (INPATIENT)
Dept: NON INVASIVE DIAGNOSTICS | Facility: HOSPITAL | Age: 49
DRG: 872 | End: 2019-04-01
Payer: COMMERCIAL

## 2019-04-01 LAB
ANION GAP SERPL CALCULATED.3IONS-SCNC: 8 MMOL/L (ref 4–13)
BUN SERPL-MCNC: 23 MG/DL (ref 7–25)
CALCIUM SERPL-MCNC: 9 MG/DL (ref 8.6–10.5)
CHLORIDE SERPL-SCNC: 102 MMOL/L (ref 98–107)
CO2 SERPL-SCNC: 23 MMOL/L (ref 21–31)
CREAT SERPL-MCNC: 1 MG/DL (ref 0.7–1.3)
ERYTHROCYTE [DISTWIDTH] IN BLOOD BY AUTOMATED COUNT: 13.8 % (ref 11.5–14.5)
FLUAV AG SPEC QL: NOT DETECTED
FLUBV AG SPEC QL: NOT DETECTED
GFR SERPL CREATININE-BSD FRML MDRD: 89 ML/MIN/1.73SQ M
GLUCOSE SERPL-MCNC: 132 MG/DL (ref 65–140)
GLUCOSE SERPL-MCNC: 152 MG/DL (ref 65–99)
GLUCOSE SERPL-MCNC: 171 MG/DL (ref 65–140)
GLUCOSE SERPL-MCNC: 259 MG/DL (ref 65–140)
GLUCOSE SERPL-MCNC: 263 MG/DL (ref 65–140)
HCT VFR BLD AUTO: 46.5 % (ref 42–47)
HGB BLD-MCNC: 15.7 G/DL (ref 14–18)
MCH RBC QN AUTO: 30.8 PG (ref 26–34)
MCHC RBC AUTO-ENTMCNC: 33.8 G/DL (ref 31–37)
MCV RBC AUTO: 91 FL (ref 81–99)
PLATELET # BLD AUTO: 156 THOUSANDS/UL (ref 149–390)
PMV BLD AUTO: 10.3 FL (ref 8.6–11.7)
POTASSIUM SERPL-SCNC: 3.7 MMOL/L (ref 3.5–5.5)
PROCALCITONIN SERPL-MCNC: 3.46 NG/ML
RBC # BLD AUTO: 5.1 MILLION/UL (ref 4.3–5.9)
RSV B RNA SPEC QL NAA+PROBE: NOT DETECTED
SODIUM SERPL-SCNC: 133 MMOL/L (ref 134–143)
VANCOMYCIN TROUGH SERPL-MCNC: 14.7 UG/ML (ref 5–12)
WBC # BLD AUTO: 9.6 THOUSAND/UL (ref 4.8–10.8)

## 2019-04-01 PROCEDURE — 85027 COMPLETE CBC AUTOMATED: CPT | Performed by: PHYSICIAN ASSISTANT

## 2019-04-01 PROCEDURE — 80048 BASIC METABOLIC PNL TOTAL CA: CPT | Performed by: PHYSICIAN ASSISTANT

## 2019-04-01 PROCEDURE — 82948 REAGENT STRIP/BLOOD GLUCOSE: CPT

## 2019-04-01 PROCEDURE — 87040 BLOOD CULTURE FOR BACTERIA: CPT | Performed by: PHYSICIAN ASSISTANT

## 2019-04-01 PROCEDURE — 93971 EXTREMITY STUDY: CPT

## 2019-04-01 PROCEDURE — 80202 ASSAY OF VANCOMYCIN: CPT | Performed by: HOSPITALIST

## 2019-04-01 PROCEDURE — 84145 PROCALCITONIN (PCT): CPT | Performed by: PHYSICIAN ASSISTANT

## 2019-04-01 PROCEDURE — 99232 SBSQ HOSP IP/OBS MODERATE 35: CPT | Performed by: NURSE PRACTITIONER

## 2019-04-01 PROCEDURE — 93971 EXTREMITY STUDY: CPT | Performed by: SURGERY

## 2019-04-01 RX ORDER — CEFAZOLIN SODIUM 2 G/50ML
2000 SOLUTION INTRAVENOUS EVERY 8 HOURS
Status: DISCONTINUED | OUTPATIENT
Start: 2019-04-01 | End: 2019-04-03

## 2019-04-01 RX ORDER — FUROSEMIDE 10 MG/ML
40 INJECTION INTRAMUSCULAR; INTRAVENOUS ONCE
Status: COMPLETED | OUTPATIENT
Start: 2019-04-01 | End: 2019-04-01

## 2019-04-01 RX ADMIN — INSULIN LISPRO 8 UNITS: 100 INJECTION, SOLUTION INTRAVENOUS; SUBCUTANEOUS at 21:50

## 2019-04-01 RX ADMIN — LOSARTAN POTASSIUM 100 MG: 50 TABLET, FILM COATED ORAL at 09:22

## 2019-04-01 RX ADMIN — NICOTINE 1 PATCH: 21 PATCH, EXTENDED RELEASE TRANSDERMAL at 09:23

## 2019-04-01 RX ADMIN — ATORVASTATIN CALCIUM 20 MG: 20 TABLET, FILM COATED ORAL at 09:22

## 2019-04-01 RX ADMIN — PREDNISONE 10 MG: 10 TABLET ORAL at 09:23

## 2019-04-01 RX ADMIN — INSULIN LISPRO 1 UNITS: 100 INJECTION, SOLUTION INTRAVENOUS; SUBCUTANEOUS at 16:43

## 2019-04-01 RX ADMIN — KETOROLAC TROMETHAMINE 30 MG: 30 INJECTION, SOLUTION INTRAMUSCULAR; INTRAVENOUS at 06:31

## 2019-04-01 RX ADMIN — CEFAZOLIN SODIUM 2000 MG: 2 SOLUTION INTRAVENOUS at 18:54

## 2019-04-01 RX ADMIN — ENOXAPARIN SODIUM 40 MG: 40 INJECTION SUBCUTANEOUS at 09:24

## 2019-04-01 RX ADMIN — VANCOMYCIN HYDROCHLORIDE 2000 MG: 1 INJECTION, POWDER, LYOPHILIZED, FOR SOLUTION INTRAVENOUS at 02:58

## 2019-04-01 RX ADMIN — SERTRALINE HYDROCHLORIDE 50 MG: 25 TABLET, FILM COATED ORAL at 11:47

## 2019-04-01 RX ADMIN — INSULIN GLARGINE 25 UNITS: 100 INJECTION, SOLUTION SUBCUTANEOUS at 21:50

## 2019-04-01 RX ADMIN — KETOROLAC TROMETHAMINE 30 MG: 30 INJECTION, SOLUTION INTRAMUSCULAR; INTRAVENOUS at 17:47

## 2019-04-01 RX ADMIN — FUROSEMIDE 40 MG: 10 INJECTION, SOLUTION INTRAMUSCULAR; INTRAVENOUS at 17:47

## 2019-04-01 RX ADMIN — VANCOMYCIN HYDROCHLORIDE 2000 MG: 1 INJECTION, POWDER, LYOPHILIZED, FOR SOLUTION INTRAVENOUS at 16:38

## 2019-04-01 RX ADMIN — INSULIN LISPRO 15 UNITS: 100 INJECTION, SOLUTION INTRAVENOUS; SUBCUTANEOUS at 13:46

## 2019-04-01 RX ADMIN — FUROSEMIDE 80 MG: 10 INJECTION, SOLUTION INTRAMUSCULAR; INTRAVENOUS at 09:23

## 2019-04-01 RX ADMIN — KETOROLAC TROMETHAMINE 30 MG: 30 INJECTION, SOLUTION INTRAMUSCULAR; INTRAVENOUS at 11:47

## 2019-04-02 LAB
ANION GAP SERPL CALCULATED.3IONS-SCNC: 5 MMOL/L (ref 4–13)
BACTERIA BLD CULT: ABNORMAL
BUN SERPL-MCNC: 19 MG/DL (ref 7–25)
CALCIUM SERPL-MCNC: 8.8 MG/DL (ref 8.6–10.5)
CHLORIDE SERPL-SCNC: 103 MMOL/L (ref 98–107)
CO2 SERPL-SCNC: 26 MMOL/L (ref 21–31)
CREAT SERPL-MCNC: 0.93 MG/DL (ref 0.7–1.3)
ERYTHROCYTE [DISTWIDTH] IN BLOOD BY AUTOMATED COUNT: 13.9 % (ref 11.5–14.5)
GFR SERPL CREATININE-BSD FRML MDRD: 97 ML/MIN/1.73SQ M
GLUCOSE SERPL-MCNC: 133 MG/DL (ref 65–99)
GLUCOSE SERPL-MCNC: 148 MG/DL (ref 65–140)
GLUCOSE SERPL-MCNC: 207 MG/DL (ref 65–140)
GLUCOSE SERPL-MCNC: 218 MG/DL (ref 65–140)
GLUCOSE SERPL-MCNC: 262 MG/DL (ref 65–140)
GRAM STN SPEC: ABNORMAL
HCT VFR BLD AUTO: 44.3 % (ref 42–47)
HGB BLD-MCNC: 15 G/DL (ref 14–18)
MCH RBC QN AUTO: 30.7 PG (ref 26–34)
MCHC RBC AUTO-ENTMCNC: 33.9 G/DL (ref 31–37)
MCV RBC AUTO: 90 FL (ref 81–99)
PLATELET # BLD AUTO: 161 THOUSANDS/UL (ref 149–390)
PMV BLD AUTO: 10.5 FL (ref 8.6–11.7)
POTASSIUM SERPL-SCNC: 3.4 MMOL/L (ref 3.5–5.5)
PROCALCITONIN SERPL-MCNC: 2.23 NG/ML
RBC # BLD AUTO: 4.9 MILLION/UL (ref 4.3–5.9)
SODIUM SERPL-SCNC: 134 MMOL/L (ref 134–143)
WBC # BLD AUTO: 8.3 THOUSAND/UL (ref 4.8–10.8)

## 2019-04-02 PROCEDURE — 82948 REAGENT STRIP/BLOOD GLUCOSE: CPT

## 2019-04-02 PROCEDURE — ERR1 ERRONEOUS ENCOUNTER-DISREGARD: Performed by: INTERNAL MEDICINE

## 2019-04-02 PROCEDURE — 99233 SBSQ HOSP IP/OBS HIGH 50: CPT | Performed by: INTERNAL MEDICINE

## 2019-04-02 PROCEDURE — 84145 PROCALCITONIN (PCT): CPT | Performed by: NURSE PRACTITIONER

## 2019-04-02 PROCEDURE — 80048 BASIC METABOLIC PNL TOTAL CA: CPT | Performed by: NURSE PRACTITIONER

## 2019-04-02 PROCEDURE — 99232 SBSQ HOSP IP/OBS MODERATE 35: CPT | Performed by: NURSE PRACTITIONER

## 2019-04-02 PROCEDURE — 85027 COMPLETE CBC AUTOMATED: CPT | Performed by: NURSE PRACTITIONER

## 2019-04-02 RX ORDER — POTASSIUM CHLORIDE 20 MEQ/1
20 TABLET, EXTENDED RELEASE ORAL DAILY
Status: DISCONTINUED | OUTPATIENT
Start: 2019-04-03 | End: 2019-04-04 | Stop reason: HOSPADM

## 2019-04-02 RX ORDER — POTASSIUM CHLORIDE 20 MEQ/1
40 TABLET, EXTENDED RELEASE ORAL ONCE
Status: COMPLETED | OUTPATIENT
Start: 2019-04-02 | End: 2019-04-02

## 2019-04-02 RX ADMIN — SERTRALINE HYDROCHLORIDE 50 MG: 25 TABLET, FILM COATED ORAL at 08:33

## 2019-04-02 RX ADMIN — CEFAZOLIN SODIUM 2000 MG: 2 SOLUTION INTRAVENOUS at 23:11

## 2019-04-02 RX ADMIN — LOSARTAN POTASSIUM 100 MG: 50 TABLET, FILM COATED ORAL at 08:33

## 2019-04-02 RX ADMIN — CEFAZOLIN SODIUM 2000 MG: 2 SOLUTION INTRAVENOUS at 16:38

## 2019-04-02 RX ADMIN — INSULIN LISPRO 8 UNITS: 100 INJECTION, SOLUTION INTRAVENOUS; SUBCUTANEOUS at 22:34

## 2019-04-02 RX ADMIN — PREDNISONE 10 MG: 10 TABLET ORAL at 08:33

## 2019-04-02 RX ADMIN — CEFAZOLIN SODIUM 2000 MG: 2 SOLUTION INTRAVENOUS at 08:34

## 2019-04-02 RX ADMIN — NICOTINE 1 PATCH: 21 PATCH, EXTENDED RELEASE TRANSDERMAL at 08:38

## 2019-04-02 RX ADMIN — FUROSEMIDE 80 MG: 10 INJECTION, SOLUTION INTRAMUSCULAR; INTRAVENOUS at 08:32

## 2019-04-02 RX ADMIN — INSULIN LISPRO 2 UNITS: 100 INJECTION, SOLUTION INTRAVENOUS; SUBCUTANEOUS at 16:39

## 2019-04-02 RX ADMIN — ENOXAPARIN SODIUM 40 MG: 40 INJECTION SUBCUTANEOUS at 08:33

## 2019-04-02 RX ADMIN — CEFAZOLIN SODIUM 2000 MG: 2 SOLUTION INTRAVENOUS at 00:53

## 2019-04-02 RX ADMIN — INSULIN GLARGINE 25 UNITS: 100 INJECTION, SOLUTION SUBCUTANEOUS at 22:33

## 2019-04-02 RX ADMIN — KETOROLAC TROMETHAMINE 30 MG: 30 INJECTION, SOLUTION INTRAMUSCULAR; INTRAVENOUS at 05:47

## 2019-04-02 RX ADMIN — INSULIN LISPRO 2 UNITS: 100 INJECTION, SOLUTION INTRAVENOUS; SUBCUTANEOUS at 12:08

## 2019-04-02 RX ADMIN — POTASSIUM CHLORIDE 40 MEQ: 1500 TABLET, EXTENDED RELEASE ORAL at 16:39

## 2019-04-02 RX ADMIN — ATORVASTATIN CALCIUM 20 MG: 20 TABLET, FILM COATED ORAL at 08:33

## 2019-04-02 RX ADMIN — KETOROLAC TROMETHAMINE 30 MG: 30 INJECTION, SOLUTION INTRAMUSCULAR; INTRAVENOUS at 00:49

## 2019-04-03 ENCOUNTER — APPOINTMENT (INPATIENT)
Dept: NON INVASIVE DIAGNOSTICS | Facility: HOSPITAL | Age: 49
DRG: 872 | End: 2019-04-03
Payer: COMMERCIAL

## 2019-04-03 LAB
ANION GAP SERPL CALCULATED.3IONS-SCNC: 6 MMOL/L (ref 4–13)
BASOPHILS # BLD AUTO: 0.1 THOUSANDS/ΜL (ref 0–0.1)
BASOPHILS NFR BLD AUTO: 1 % (ref 0–2)
BUN SERPL-MCNC: 13 MG/DL (ref 7–25)
CALCIUM SERPL-MCNC: 9 MG/DL (ref 8.6–10.5)
CHLORIDE SERPL-SCNC: 106 MMOL/L (ref 98–107)
CO2 SERPL-SCNC: 26 MMOL/L (ref 21–31)
CREAT SERPL-MCNC: 0.85 MG/DL (ref 0.7–1.3)
EOSINOPHIL # BLD AUTO: 0.1 THOUSAND/ΜL (ref 0–0.61)
EOSINOPHIL NFR BLD AUTO: 1 % (ref 0–5)
ERYTHROCYTE [DISTWIDTH] IN BLOOD BY AUTOMATED COUNT: 14.1 % (ref 11.5–14.5)
GFR SERPL CREATININE-BSD FRML MDRD: 103 ML/MIN/1.73SQ M
GLUCOSE SERPL-MCNC: 148 MG/DL (ref 65–99)
GLUCOSE SERPL-MCNC: 168 MG/DL (ref 65–140)
GLUCOSE SERPL-MCNC: 225 MG/DL (ref 65–140)
GLUCOSE SERPL-MCNC: 249 MG/DL (ref 65–140)
HCT VFR BLD AUTO: 43.1 % (ref 42–47)
HGB BLD-MCNC: 14.7 G/DL (ref 14–18)
LYMPHOCYTES # BLD AUTO: 1.4 THOUSANDS/ΜL (ref 0.6–4.47)
LYMPHOCYTES NFR BLD AUTO: 16 % (ref 21–51)
MCH RBC QN AUTO: 30.9 PG (ref 26–34)
MCHC RBC AUTO-ENTMCNC: 34.1 G/DL (ref 31–37)
MCV RBC AUTO: 91 FL (ref 81–99)
MONOCYTES # BLD AUTO: 0.8 THOUSAND/ΜL (ref 0.17–1.22)
MONOCYTES NFR BLD AUTO: 9 % (ref 2–12)
NEUTROPHILS # BLD AUTO: 6.6 THOUSANDS/ΜL (ref 1.4–6.5)
NEUTS SEG NFR BLD AUTO: 74 % (ref 42–75)
PLATELET # BLD AUTO: 169 THOUSANDS/UL (ref 149–390)
PMV BLD AUTO: 9.9 FL (ref 8.6–11.7)
POTASSIUM SERPL-SCNC: 3.8 MMOL/L (ref 3.5–5.5)
RBC # BLD AUTO: 4.75 MILLION/UL (ref 4.3–5.9)
SODIUM SERPL-SCNC: 138 MMOL/L (ref 134–143)
WBC # BLD AUTO: 8.9 THOUSAND/UL (ref 4.8–10.8)

## 2019-04-03 PROCEDURE — 82948 REAGENT STRIP/BLOOD GLUCOSE: CPT

## 2019-04-03 PROCEDURE — 93306 TTE W/DOPPLER COMPLETE: CPT | Performed by: INTERNAL MEDICINE

## 2019-04-03 PROCEDURE — 99232 SBSQ HOSP IP/OBS MODERATE 35: CPT | Performed by: NURSE PRACTITIONER

## 2019-04-03 PROCEDURE — 85025 COMPLETE CBC W/AUTO DIFF WBC: CPT | Performed by: NURSE PRACTITIONER

## 2019-04-03 PROCEDURE — 93306 TTE W/DOPPLER COMPLETE: CPT

## 2019-04-03 PROCEDURE — 80048 BASIC METABOLIC PNL TOTAL CA: CPT | Performed by: NURSE PRACTITIONER

## 2019-04-03 RX ORDER — FUROSEMIDE 10 MG/ML
40 INJECTION INTRAMUSCULAR; INTRAVENOUS ONCE
Status: COMPLETED | OUTPATIENT
Start: 2019-04-03 | End: 2019-04-03

## 2019-04-03 RX ORDER — CEPHALEXIN 500 MG/1
500 CAPSULE ORAL EVERY 6 HOURS SCHEDULED
Status: DISCONTINUED | OUTPATIENT
Start: 2019-04-03 | End: 2019-04-04 | Stop reason: HOSPADM

## 2019-04-03 RX ADMIN — CEFAZOLIN SODIUM 2000 MG: 2 SOLUTION INTRAVENOUS at 08:02

## 2019-04-03 RX ADMIN — INSULIN LISPRO 8 UNITS: 100 INJECTION, SOLUTION INTRAVENOUS; SUBCUTANEOUS at 22:55

## 2019-04-03 RX ADMIN — CEPHALEXIN 500 MG: 500 CAPSULE ORAL at 17:21

## 2019-04-03 RX ADMIN — NICOTINE 1 PATCH: 21 PATCH, EXTENDED RELEASE TRANSDERMAL at 08:02

## 2019-04-03 RX ADMIN — INSULIN LISPRO 2 UNITS: 100 INJECTION, SOLUTION INTRAVENOUS; SUBCUTANEOUS at 11:36

## 2019-04-03 RX ADMIN — INSULIN LISPRO 1 UNITS: 100 INJECTION, SOLUTION INTRAVENOUS; SUBCUTANEOUS at 16:31

## 2019-04-03 RX ADMIN — FUROSEMIDE 40 MG: 10 INJECTION, SOLUTION INTRAMUSCULAR; INTRAVENOUS at 15:10

## 2019-04-03 RX ADMIN — ENOXAPARIN SODIUM 40 MG: 40 INJECTION SUBCUTANEOUS at 08:03

## 2019-04-03 RX ADMIN — INSULIN LISPRO 1 UNITS: 100 INJECTION, SOLUTION INTRAVENOUS; SUBCUTANEOUS at 08:01

## 2019-04-03 RX ADMIN — SERTRALINE HYDROCHLORIDE 50 MG: 25 TABLET, FILM COATED ORAL at 08:02

## 2019-04-03 RX ADMIN — POTASSIUM CHLORIDE 20 MEQ: 1500 TABLET, EXTENDED RELEASE ORAL at 08:02

## 2019-04-03 RX ADMIN — ATORVASTATIN CALCIUM 20 MG: 20 TABLET, FILM COATED ORAL at 08:02

## 2019-04-03 RX ADMIN — CEPHALEXIN 500 MG: 500 CAPSULE ORAL at 15:10

## 2019-04-03 RX ADMIN — CEPHALEXIN 500 MG: 500 CAPSULE ORAL at 23:15

## 2019-04-03 RX ADMIN — FUROSEMIDE 80 MG: 80 TABLET ORAL at 08:02

## 2019-04-03 RX ADMIN — INSULIN GLARGINE 25 UNITS: 100 INJECTION, SOLUTION SUBCUTANEOUS at 22:55

## 2019-04-03 RX ADMIN — LOSARTAN POTASSIUM 100 MG: 50 TABLET, FILM COATED ORAL at 08:02

## 2019-04-03 RX ADMIN — ACETAMINOPHEN 650 MG: 325 TABLET ORAL at 09:09

## 2019-04-04 VITALS
SYSTOLIC BLOOD PRESSURE: 152 MMHG | WEIGHT: 293.3 LBS | DIASTOLIC BLOOD PRESSURE: 86 MMHG | RESPIRATION RATE: 20 BRPM | BODY MASS INDEX: 41.06 KG/M2 | HEIGHT: 71 IN | HEART RATE: 71 BPM | OXYGEN SATURATION: 93 % | TEMPERATURE: 99.3 F

## 2019-04-04 LAB
ANION GAP SERPL CALCULATED.3IONS-SCNC: 7 MMOL/L (ref 4–13)
BACTERIA BLD CULT: NORMAL
BUN SERPL-MCNC: 11 MG/DL (ref 7–25)
CALCIUM SERPL-MCNC: 9 MG/DL (ref 8.6–10.5)
CHLORIDE SERPL-SCNC: 105 MMOL/L (ref 98–107)
CO2 SERPL-SCNC: 27 MMOL/L (ref 21–31)
CREAT SERPL-MCNC: 0.76 MG/DL (ref 0.7–1.3)
ERYTHROCYTE [DISTWIDTH] IN BLOOD BY AUTOMATED COUNT: 13.8 % (ref 11.5–14.5)
GFR SERPL CREATININE-BSD FRML MDRD: 108 ML/MIN/1.73SQ M
GLUCOSE SERPL-MCNC: 143 MG/DL (ref 65–99)
GLUCOSE SERPL-MCNC: 152 MG/DL (ref 65–140)
GLUCOSE SERPL-MCNC: 248 MG/DL (ref 65–140)
HCT VFR BLD AUTO: 41.7 % (ref 42–47)
HGB BLD-MCNC: 14.3 G/DL (ref 14–18)
MCH RBC QN AUTO: 31.2 PG (ref 26–34)
MCHC RBC AUTO-ENTMCNC: 34.4 G/DL (ref 31–37)
MCV RBC AUTO: 91 FL (ref 81–99)
PLATELET # BLD AUTO: 164 THOUSANDS/UL (ref 149–390)
PMV BLD AUTO: 9.8 FL (ref 8.6–11.7)
POTASSIUM SERPL-SCNC: 3.7 MMOL/L (ref 3.5–5.5)
RBC # BLD AUTO: 4.59 MILLION/UL (ref 4.3–5.9)
SODIUM SERPL-SCNC: 139 MMOL/L (ref 134–143)
WBC # BLD AUTO: 8.5 THOUSAND/UL (ref 4.8–10.8)

## 2019-04-04 PROCEDURE — 80048 BASIC METABOLIC PNL TOTAL CA: CPT | Performed by: NURSE PRACTITIONER

## 2019-04-04 PROCEDURE — 85027 COMPLETE CBC AUTOMATED: CPT | Performed by: NURSE PRACTITIONER

## 2019-04-04 PROCEDURE — 99239 HOSP IP/OBS DSCHRG MGMT >30: CPT | Performed by: NURSE PRACTITIONER

## 2019-04-04 PROCEDURE — 82948 REAGENT STRIP/BLOOD GLUCOSE: CPT

## 2019-04-04 RX ORDER — POTASSIUM CHLORIDE 750 MG/1
10 CAPSULE, EXTENDED RELEASE ORAL DAILY
Qty: 30 CAPSULE | Refills: 0 | Status: SHIPPED | OUTPATIENT
Start: 2019-04-04 | End: 2019-05-04

## 2019-04-04 RX ORDER — NICOTINE 21 MG/24HR
1 PATCH, TRANSDERMAL 24 HOURS TRANSDERMAL DAILY
Qty: 28 PATCH | Refills: 0 | Status: SHIPPED | OUTPATIENT
Start: 2019-04-05

## 2019-04-04 RX ORDER — CEPHALEXIN 500 MG/1
500 CAPSULE ORAL EVERY 6 HOURS SCHEDULED
Qty: 38 CAPSULE | Refills: 0 | Status: SHIPPED | OUTPATIENT
Start: 2019-04-04 | End: 2019-04-14

## 2019-04-04 RX ADMIN — NICOTINE 1 PATCH: 21 PATCH, EXTENDED RELEASE TRANSDERMAL at 08:15

## 2019-04-04 RX ADMIN — INSULIN LISPRO 1 UNITS: 100 INJECTION, SOLUTION INTRAVENOUS; SUBCUTANEOUS at 08:14

## 2019-04-04 RX ADMIN — FUROSEMIDE 80 MG: 80 TABLET ORAL at 08:14

## 2019-04-04 RX ADMIN — CEPHALEXIN 500 MG: 500 CAPSULE ORAL at 12:25

## 2019-04-04 RX ADMIN — ATORVASTATIN CALCIUM 20 MG: 20 TABLET, FILM COATED ORAL at 08:13

## 2019-04-04 RX ADMIN — OXYCODONE HYDROCHLORIDE AND ACETAMINOPHEN 2 TABLET: 5; 325 TABLET ORAL at 12:26

## 2019-04-04 RX ADMIN — OXYCODONE HYDROCHLORIDE AND ACETAMINOPHEN 2 TABLET: 5; 325 TABLET ORAL at 08:19

## 2019-04-04 RX ADMIN — SERTRALINE HYDROCHLORIDE 50 MG: 25 TABLET, FILM COATED ORAL at 08:15

## 2019-04-04 RX ADMIN — CEPHALEXIN 500 MG: 500 CAPSULE ORAL at 05:25

## 2019-04-04 RX ADMIN — INSULIN LISPRO 3 UNITS: 100 INJECTION, SOLUTION INTRAVENOUS; SUBCUTANEOUS at 11:40

## 2019-04-04 RX ADMIN — POTASSIUM CHLORIDE 20 MEQ: 1500 TABLET, EXTENDED RELEASE ORAL at 08:15

## 2019-04-04 RX ADMIN — LOSARTAN POTASSIUM 100 MG: 50 TABLET, FILM COATED ORAL at 08:15

## 2019-04-04 RX ADMIN — ENOXAPARIN SODIUM 40 MG: 40 INJECTION SUBCUTANEOUS at 08:14

## 2019-04-06 LAB
BACTERIA BLD CULT: NORMAL
BACTERIA BLD CULT: NORMAL

## 2019-04-09 ENCOUNTER — TRANSCRIBE ORDERS (OUTPATIENT)
Dept: PHYSICAL THERAPY | Facility: CLINIC | Age: 49
End: 2019-04-09

## 2019-04-09 ENCOUNTER — EVALUATION (OUTPATIENT)
Dept: PHYSICAL THERAPY | Facility: CLINIC | Age: 49
End: 2019-04-09
Payer: COMMERCIAL

## 2019-04-09 DIAGNOSIS — I89.0 LYMPHEDEMA: Primary | ICD-10-CM

## 2019-04-09 DIAGNOSIS — L03.115 CELLULITIS OF LEG, RIGHT: ICD-10-CM

## 2019-04-09 DIAGNOSIS — I89.0 LYMPHEDEMA OF BOTH LOWER EXTREMITIES: Primary | ICD-10-CM

## 2019-04-09 PROCEDURE — 97535 SELF CARE MNGMENT TRAINING: CPT | Performed by: PHYSICAL THERAPIST

## 2019-04-09 PROCEDURE — 97140 MANUAL THERAPY 1/> REGIONS: CPT | Performed by: PHYSICAL THERAPIST

## 2019-04-09 PROCEDURE — 97161 PT EVAL LOW COMPLEX 20 MIN: CPT | Performed by: PHYSICAL THERAPIST

## 2019-04-11 ENCOUNTER — OFFICE VISIT (OUTPATIENT)
Dept: PHYSICAL THERAPY | Facility: CLINIC | Age: 49
End: 2019-04-11
Payer: COMMERCIAL

## 2019-04-11 DIAGNOSIS — I89.0 LYMPHEDEMA OF BOTH LOWER EXTREMITIES: ICD-10-CM

## 2019-04-11 DIAGNOSIS — L03.115 CELLULITIS OF LEG, RIGHT: Primary | ICD-10-CM

## 2019-04-11 PROCEDURE — 97140 MANUAL THERAPY 1/> REGIONS: CPT | Performed by: PHYSICAL THERAPIST

## 2019-04-11 PROCEDURE — 97110 THERAPEUTIC EXERCISES: CPT | Performed by: PHYSICAL THERAPIST

## 2019-04-15 ENCOUNTER — OFFICE VISIT (OUTPATIENT)
Dept: PHYSICAL THERAPY | Facility: CLINIC | Age: 49
End: 2019-04-15
Payer: COMMERCIAL

## 2019-04-15 DIAGNOSIS — I89.0 LYMPHEDEMA OF BOTH LOWER EXTREMITIES: ICD-10-CM

## 2019-04-15 DIAGNOSIS — L03.115 CELLULITIS OF LEG, RIGHT: Primary | ICD-10-CM

## 2019-04-15 PROCEDURE — 97140 MANUAL THERAPY 1/> REGIONS: CPT | Performed by: PHYSICAL THERAPIST

## 2019-04-15 PROCEDURE — 97110 THERAPEUTIC EXERCISES: CPT | Performed by: PHYSICAL THERAPIST

## 2019-04-24 ENCOUNTER — OFFICE VISIT (OUTPATIENT)
Dept: PHYSICAL THERAPY | Facility: CLINIC | Age: 49
End: 2019-04-24
Payer: COMMERCIAL

## 2019-04-24 DIAGNOSIS — I89.0 LYMPHEDEMA OF BOTH LOWER EXTREMITIES: ICD-10-CM

## 2019-04-24 DIAGNOSIS — L03.115 CELLULITIS OF LEG, RIGHT: Primary | ICD-10-CM

## 2019-04-24 PROCEDURE — 97140 MANUAL THERAPY 1/> REGIONS: CPT | Performed by: PHYSICAL THERAPIST

## 2019-04-25 ENCOUNTER — HOSPITAL ENCOUNTER (OUTPATIENT)
Dept: NON INVASIVE DIAGNOSTICS | Facility: HOSPITAL | Age: 49
Discharge: HOME | End: 2019-04-25
Payer: COMMERCIAL

## 2019-04-25 DIAGNOSIS — L03.115 CELLULITIS OF LEG, RIGHT: ICD-10-CM

## 2019-04-25 PROCEDURE — 93970 EXTREMITY STUDY: CPT

## 2019-04-25 PROCEDURE — 93970 EXTREMITY STUDY: CPT | Performed by: SURGERY

## 2019-05-01 ENCOUNTER — TELEPHONE (OUTPATIENT)
Dept: INFECTIOUS DISEASES | Facility: CLINIC | Age: 49
End: 2019-05-01

## 2019-05-02 ENCOUNTER — TELEPHONE (OUTPATIENT)
Dept: INFECTIOUS DISEASES | Facility: CLINIC | Age: 49
End: 2019-05-02

## 2019-06-03 ENCOUNTER — OFFICE VISIT (OUTPATIENT)
Dept: INFECTIOUS DISEASES | Facility: HOSPITAL | Age: 49
End: 2019-06-03
Payer: COMMERCIAL

## 2019-06-03 VITALS
WEIGHT: 277 LBS | HEIGHT: 71 IN | HEART RATE: 76 BPM | SYSTOLIC BLOOD PRESSURE: 139 MMHG | BODY MASS INDEX: 38.78 KG/M2 | DIASTOLIC BLOOD PRESSURE: 75 MMHG | TEMPERATURE: 98.2 F

## 2019-06-03 DIAGNOSIS — L03.115 CELLULITIS OF LEG, RIGHT: ICD-10-CM

## 2019-06-03 DIAGNOSIS — E11.8 TYPE 2 DIABETES MELLITUS WITH COMPLICATION, WITHOUT LONG-TERM CURRENT USE OF INSULIN (HCC): Primary | Chronic | ICD-10-CM

## 2019-06-03 DIAGNOSIS — R78.81 GRAM-POSITIVE BACTEREMIA: ICD-10-CM

## 2019-06-03 DIAGNOSIS — I10 ESSENTIAL HYPERTENSION: Chronic | ICD-10-CM

## 2019-06-03 PROCEDURE — 99214 OFFICE O/P EST MOD 30 MIN: CPT | Performed by: INTERNAL MEDICINE

## 2019-10-03 ENCOUNTER — TRANSCRIBE ORDERS (OUTPATIENT)
Dept: URGENT CARE | Facility: CLINIC | Age: 49
End: 2019-10-03

## 2019-10-03 ENCOUNTER — TRANSCRIBE ORDERS (OUTPATIENT)
Dept: LAB | Facility: CLINIC | Age: 49
End: 2019-10-03

## 2019-10-03 ENCOUNTER — APPOINTMENT (OUTPATIENT)
Dept: LAB | Facility: CLINIC | Age: 49
End: 2019-10-03
Payer: COMMERCIAL

## 2019-10-03 ENCOUNTER — APPOINTMENT (OUTPATIENT)
Dept: URGENT CARE | Facility: CLINIC | Age: 49
End: 2019-10-03
Payer: COMMERCIAL

## 2019-10-03 DIAGNOSIS — E78.5 HYPERLIPIDEMIA, UNSPECIFIED HYPERLIPIDEMIA TYPE: ICD-10-CM

## 2019-10-03 DIAGNOSIS — E11.9 TYPE 2 DIABETES MELLITUS WITHOUT COMPLICATION, WITHOUT LONG-TERM CURRENT USE OF INSULIN (HCC): ICD-10-CM

## 2019-10-03 DIAGNOSIS — I10 HYPERTENSION, UNSPECIFIED TYPE: ICD-10-CM

## 2019-10-03 DIAGNOSIS — E11.9 TYPE 2 DIABETES MELLITUS WITHOUT COMPLICATION, WITHOUT LONG-TERM CURRENT USE OF INSULIN (HCC): Primary | ICD-10-CM

## 2019-10-03 LAB
ALBUMIN SERPL BCP-MCNC: 3.9 G/DL (ref 3.5–5)
ALP SERPL-CCNC: 107 U/L (ref 46–116)
ALT SERPL W P-5'-P-CCNC: 76 U/L (ref 12–78)
ANION GAP SERPL CALCULATED.3IONS-SCNC: 6 MMOL/L (ref 4–13)
AST SERPL W P-5'-P-CCNC: 38 U/L (ref 5–45)
BILIRUB SERPL-MCNC: 0.8 MG/DL (ref 0.2–1)
BUN SERPL-MCNC: 16 MG/DL (ref 5–25)
CALCIUM SERPL-MCNC: 9.4 MG/DL (ref 8.3–10.1)
CHLORIDE SERPL-SCNC: 102 MMOL/L (ref 100–108)
CHOLEST SERPL-MCNC: 158 MG/DL (ref 50–200)
CO2 SERPL-SCNC: 28 MMOL/L (ref 21–32)
CREAT SERPL-MCNC: 1.02 MG/DL (ref 0.6–1.3)
ERYTHROCYTE [DISTWIDTH] IN BLOOD BY AUTOMATED COUNT: 13.5 % (ref 11.6–15.1)
EST. AVERAGE GLUCOSE BLD GHB EST-MCNC: 171 MG/DL
GFR SERPL CREATININE-BSD FRML MDRD: 86 ML/MIN/1.73SQ M
GLUCOSE P FAST SERPL-MCNC: 177 MG/DL (ref 65–99)
HBA1C MFR BLD: 7.6 % (ref 4.2–6.3)
HCT VFR BLD AUTO: 50.3 % (ref 36.5–49.3)
HDLC SERPL-MCNC: 31 MG/DL (ref 40–60)
HGB BLD-MCNC: 16.6 G/DL (ref 12–17)
LDLC SERPL CALC-MCNC: 93 MG/DL (ref 0–100)
MCH RBC QN AUTO: 31 PG (ref 26.8–34.3)
MCHC RBC AUTO-ENTMCNC: 33 G/DL (ref 31.4–37.4)
MCV RBC AUTO: 94 FL (ref 82–98)
NONHDLC SERPL-MCNC: 127 MG/DL
PLATELET # BLD AUTO: 127 THOUSANDS/UL (ref 149–390)
PMV BLD AUTO: 13.7 FL (ref 8.9–12.7)
POTASSIUM SERPL-SCNC: 4.4 MMOL/L (ref 3.5–5.3)
PROT SERPL-MCNC: 8 G/DL (ref 6.4–8.2)
RBC # BLD AUTO: 5.36 MILLION/UL (ref 3.88–5.62)
SODIUM SERPL-SCNC: 136 MMOL/L (ref 136–145)
TRIGL SERPL-MCNC: 168 MG/DL
WBC # BLD AUTO: 8.84 THOUSAND/UL (ref 4.31–10.16)

## 2019-10-03 PROCEDURE — 83036 HEMOGLOBIN GLYCOSYLATED A1C: CPT

## 2019-10-03 PROCEDURE — 36415 COLL VENOUS BLD VENIPUNCTURE: CPT

## 2019-10-03 PROCEDURE — 85027 COMPLETE CBC AUTOMATED: CPT

## 2019-10-03 PROCEDURE — 80061 LIPID PANEL: CPT

## 2019-10-03 PROCEDURE — 80053 COMPREHEN METABOLIC PANEL: CPT

## 2022-10-12 ENCOUNTER — OFFICE VISIT (OUTPATIENT)
Dept: URGENT CARE | Facility: MEDICAL CENTER | Age: 52
End: 2022-10-12
Payer: COMMERCIAL

## 2022-10-12 VITALS
HEART RATE: 96 BPM | HEIGHT: 71 IN | RESPIRATION RATE: 18 BRPM | SYSTOLIC BLOOD PRESSURE: 132 MMHG | TEMPERATURE: 98.3 F | BODY MASS INDEX: 39.9 KG/M2 | DIASTOLIC BLOOD PRESSURE: 70 MMHG | OXYGEN SATURATION: 96 % | WEIGHT: 285 LBS

## 2022-10-12 DIAGNOSIS — H61.22 IMPACTED CERUMEN OF LEFT EAR: ICD-10-CM

## 2022-10-12 DIAGNOSIS — H60.502 ACUTE OTITIS EXTERNA OF LEFT EAR, UNSPECIFIED TYPE: Primary | ICD-10-CM

## 2022-10-12 PROCEDURE — 99214 OFFICE O/P EST MOD 30 MIN: CPT | Performed by: PHYSICIAN ASSISTANT

## 2022-10-12 RX ORDER — OFLOXACIN 3 MG/ML
10 SOLUTION/ DROPS OPHTHALMIC DAILY
Qty: 5 ML | Refills: 0 | Status: SHIPPED | OUTPATIENT
Start: 2022-10-12 | End: 2022-10-19

## 2022-10-12 NOTE — PROGRESS NOTES
[address] [facility]        NAME: Yoandy Fox is a 46 y o  male  : 1970    MRN: [id number]  DATE: 2022  TIME: 7:41 PM    Assessment and Plan   Acute otitis externa of left ear, unspecified type [H60 502]  1  Acute otitis externa of left ear, unspecified type  ofloxacin (OCUFLOX) 0 3 % ophthalmic solution   2  Impacted cerumen of left ear       Declined COVID testing  Patient Instructions     Avoid Q-Tip use  Over the counter debrox drops  Follow up with ENT if symptoms persist  Follow up with PCP in 3-5 days  Proceed to  ER if symptoms worsen  Chief Complaint     Chief Complaint   Patient presents with   • Ear Fullness     To left ear x 5 days    • Fever     Low grade temp with chills          History of Present Illness       Q-tip use  Ear Fullness   There is pain in the left ear  This is a new problem  Episode onset: 5 days  Maximum temperature: subjective  The pain is moderate  Associated symptoms include coughing (minor)  Pertinent negatives include no ear discharge, headaches, hearing loss, rhinorrhea or sore throat  He has tried NSAIDs for the symptoms  Review of Systems   Review of Systems   Constitutional: Positive for chills and fever  HENT: Positive for ear pain  Negative for congestion, ear discharge, hearing loss, rhinorrhea, sinus pressure, sinus pain, sneezing and sore throat  Respiratory: Positive for cough (minor)  Neurological: Negative for headaches           Current Medications       Current Outpatient Medications:   •  atorvastatin (LIPITOR) 20 mg tablet, Take 20 mg by mouth daily, Disp: , Rfl: 3  •  Empagliflozin 25 MG TABS, Take 25 mg by mouth every morning, Disp: , Rfl:   •  furosemide (LASIX) 80 mg tablet, Take 80 mg by mouth daily, Disp: , Rfl: 5  •  glipiZIDE (GLUCOTROL XL) 2 5 mg 24 hr tablet, Take 5 mg by mouth daily with breakfast , Disp: , Rfl: 1  •  glipiZIDE (GLUCOTROL XL) 2 5 mg 24 hr tablet, Take 2 5 mg by mouth daily with dinner , Disp: , Rfl: 3  •  INVOKANA 300 MG TABS, TAKE 1 TABLET BY ORAL ROUTE EVERY DAY BEFORE THE FIRST MEAL OF THE DAY, Disp: , Rfl: 5  •  irbesartan (AVAPRO) 300 mg tablet, , Disp: , Rfl:   •  JANUMET XR  MG TB24, TAKE 2 TABLETS BY MOUTH DAILY WITH EVENING MEAL  SWALLOW WHOLE  DO NOT CRUSH,CHEW AND / OR DIVIDE, Disp: , Rfl: 5  •  nicotine (NICODERM CQ) 21 mg/24 hr TD 24 hr patch, Place 1 patch on the skin daily, Disp: 28 patch, Rfl: 0  •  ofloxacin (OCUFLOX) 0 3 % ophthalmic solution, Administer 10 drops into ears in the morning for 7 days, Disp: 5 mL, Rfl: 0  •  Semaglutide,0 25 or 0 5MG/DOS, 2 MG/1 5ML SOPN, Inject under the skin, Disp: , Rfl:   •  sertraline (ZOLOFT) 50 mg tablet, Take 50 mg by mouth daily, Disp: , Rfl: 3  •  potassium chloride (MICRO-K) 10 MEQ CR capsule, Take 1 capsule (10 mEq total) by mouth daily for 30 days, Disp: 30 capsule, Rfl: 0    Current Allergies     Allergies as of 10/12/2022 - Reviewed 10/12/2022   Allergen Reaction Noted   • Penicillins  03/26/2019            The following portions of the patient's history were reviewed and updated as appropriate: allergies, current medications, past family history, past medical history, past social history, past surgical history and problem list      Past Medical History:   Diagnosis Date   • Anxiety    • Depression    • Diabetes mellitus ([facility] [address] )    • Hyperlipidemia    • Hypertension        No past surgical history on file  Family History   Adopted: Yes         Medications have been verified  Objective   /70   Pulse 96   Temp 98 3 °F (36 8 °C)   Resp 18   Ht 5' 11" (1 803 m)   Wt 129 kg (285 lb)   SpO2 96%   BMI 39 75 kg/m²   No LMP for male patient  Physical Exam     Physical Exam  Vitals reviewed  Constitutional:       General: He is not in acute distress  Appearance: He is well-developed  He is not diaphoretic  HENT:      Head: Normocephalic        Right Ear: Tympanic membrane and external ear normal  Left Ear: Tympanic membrane normal  There is impacted cerumen  Ears:      Comments: L canal erythematous     Nose: Nose normal       Mouth/Throat:      Pharynx: No oropharyngeal exudate or posterior oropharyngeal erythema  Eyes:      Conjunctiva/sclera: Conjunctivae normal    Cardiovascular:      Rate and Rhythm: Normal rate and regular rhythm  Heart sounds: Normal heart sounds  No murmur heard  No friction rub  No gallop  Pulmonary:      Effort: Pulmonary effort is normal  No respiratory distress  Breath sounds: Normal breath sounds  No wheezing or rales  Chest:      Chest wall: No tenderness  Lymphadenopathy:      Cervical: No cervical adenopathy  Skin:     General: Skin is warm  Neurological:      Mental Status: He is alert and oriented to person, place, and time  Psychiatric:         Behavior: Behavior normal          Thought Content:  Thought content normal          Judgment: Judgment normal

## 2022-10-12 NOTE — PATIENT INSTRUCTIONS
Avoid Q-Tip use  Over the counter debrox drops  Follow up with ENT if symptoms persist  Follow up with PCP in 3-5 days  Proceed to  ER if symptoms worsen

## 2022-11-21 ENCOUNTER — APPOINTMENT (OUTPATIENT)
Dept: LAB | Facility: CLINIC | Age: 52
End: 2022-11-21

## 2022-11-21 DIAGNOSIS — I10 ESSENTIAL HYPERTENSION, MALIGNANT: ICD-10-CM

## 2022-11-21 DIAGNOSIS — N40.1 ENLARGED PROSTATE WITH URINARY OBSTRUCTION: ICD-10-CM

## 2022-11-21 DIAGNOSIS — E78.5 HYPERLIPIDEMIA, UNSPECIFIED HYPERLIPIDEMIA TYPE: ICD-10-CM

## 2022-11-21 DIAGNOSIS — N13.8 ENLARGED PROSTATE WITH URINARY OBSTRUCTION: ICD-10-CM

## 2022-11-21 DIAGNOSIS — R35.1 NOCTURIA: ICD-10-CM

## 2022-11-21 DIAGNOSIS — E11.9 TYPE 2 DIABETES MELLITUS WITHOUT COMPLICATION, WITHOUT LONG-TERM CURRENT USE OF INSULIN (HCC): ICD-10-CM

## 2022-11-21 LAB
ALBUMIN SERPL BCP-MCNC: 3.4 G/DL (ref 3.5–5)
ALP SERPL-CCNC: 148 U/L (ref 46–116)
ALT SERPL W P-5'-P-CCNC: 121 U/L (ref 12–78)
ANION GAP SERPL CALCULATED.3IONS-SCNC: 5 MMOL/L (ref 4–13)
AST SERPL W P-5'-P-CCNC: 69 U/L (ref 5–45)
BILIRUB SERPL-MCNC: 0.7 MG/DL (ref 0.2–1)
BUN SERPL-MCNC: 9 MG/DL (ref 5–25)
CALCIUM ALBUM COR SERPL-MCNC: 9.6 MG/DL (ref 8.3–10.1)
CALCIUM SERPL-MCNC: 9.1 MG/DL (ref 8.3–10.1)
CHLORIDE SERPL-SCNC: 107 MMOL/L (ref 96–108)
CHOLEST SERPL-MCNC: 189 MG/DL
CO2 SERPL-SCNC: 23 MMOL/L (ref 21–32)
CREAT SERPL-MCNC: 0.82 MG/DL (ref 0.6–1.3)
EST. AVERAGE GLUCOSE BLD GHB EST-MCNC: 212 MG/DL
GFR SERPL CREATININE-BSD FRML MDRD: 101 ML/MIN/1.73SQ M
GLUCOSE P FAST SERPL-MCNC: 227 MG/DL (ref 65–99)
HBA1C MFR BLD: 9 %
HDLC SERPL-MCNC: 34 MG/DL
LDLC SERPL CALC-MCNC: 109 MG/DL (ref 0–100)
NONHDLC SERPL-MCNC: 155 MG/DL
POTASSIUM SERPL-SCNC: 4 MMOL/L (ref 3.5–5.3)
PROT SERPL-MCNC: 8 G/DL (ref 6.4–8.4)
PSA SERPL-MCNC: <0.1 NG/ML (ref 0–4)
SODIUM SERPL-SCNC: 135 MMOL/L (ref 135–147)
TRIGL SERPL-MCNC: 232 MG/DL

## 2022-11-22 LAB
BASOPHILS # BLD AUTO: 0.08 THOUSANDS/ÂΜL (ref 0–0.1)
BASOPHILS NFR BLD AUTO: 1 % (ref 0–1)
EOSINOPHIL # BLD AUTO: 0.12 THOUSAND/ÂΜL (ref 0–0.61)
EOSINOPHIL NFR BLD AUTO: 2 % (ref 0–6)
ERYTHROCYTE [DISTWIDTH] IN BLOOD BY AUTOMATED COUNT: 13.2 % (ref 11.6–15.1)
HCT VFR BLD AUTO: 55.7 % (ref 36.5–49.3)
HGB BLD-MCNC: 18.4 G/DL (ref 12–17)
IMM GRANULOCYTES # BLD AUTO: 0.06 THOUSAND/UL (ref 0–0.2)
IMM GRANULOCYTES NFR BLD AUTO: 1 % (ref 0–2)
LG PLATELETS BLD QL SMEAR: PRESENT
LYMPHOCYTES # BLD AUTO: 1.21 THOUSANDS/ÂΜL (ref 0.6–4.47)
LYMPHOCYTES NFR BLD AUTO: 15 % (ref 14–44)
MCH RBC QN AUTO: 30.6 PG (ref 26.8–34.3)
MCHC RBC AUTO-ENTMCNC: 33 G/DL (ref 31.4–37.4)
MCV RBC AUTO: 93 FL (ref 82–98)
MONOCYTES # BLD AUTO: 0.57 THOUSAND/ÂΜL (ref 0.17–1.22)
MONOCYTES NFR BLD AUTO: 7 % (ref 4–12)
NEUTROPHILS # BLD AUTO: 6.22 THOUSANDS/ÂΜL (ref 1.85–7.62)
NEUTS SEG NFR BLD AUTO: 75 % (ref 43–75)
NRBC BLD AUTO-RTO: 0 /100 WBCS
PATHOLOGY REVIEW: YES
PLATELET # BLD AUTO: 95 THOUSANDS/UL (ref 149–390)
PLATELET BLD QL SMEAR: ABNORMAL
PMV BLD AUTO: 14.2 FL (ref 8.9–12.7)
RBC # BLD AUTO: 6.02 MILLION/UL (ref 3.88–5.62)
WBC # BLD AUTO: 8.26 THOUSAND/UL (ref 4.31–10.16)

## 2022-12-19 ENCOUNTER — HOSPITAL ENCOUNTER (OUTPATIENT)
Dept: ULTRASOUND IMAGING | Facility: HOSPITAL | Age: 52
Discharge: HOME | End: 2022-12-19
Attending: INTERNAL MEDICINE

## 2022-12-19 DIAGNOSIS — R94.5 NONSPECIFIC ABNORMAL RESULTS OF LIVER FUNCTION STUDY: ICD-10-CM

## 2022-12-19 DIAGNOSIS — R80.9 PROTEINURIA, UNSPECIFIED TYPE: ICD-10-CM

## 2022-12-19 DIAGNOSIS — D69.1: ICD-10-CM

## 2023-03-03 ENCOUNTER — TRANSCRIBE ORDERS (OUTPATIENT)
Dept: LAB | Facility: CLINIC | Age: 53
End: 2023-03-03

## 2023-03-03 ENCOUNTER — APPOINTMENT (OUTPATIENT)
Dept: LAB | Facility: CLINIC | Age: 53
End: 2023-03-03

## 2023-03-03 DIAGNOSIS — E78.5 HYPERLIPIDEMIA, UNSPECIFIED HYPERLIPIDEMIA TYPE: ICD-10-CM

## 2023-03-03 DIAGNOSIS — I10 HYPERTENSION, UNSPECIFIED TYPE: ICD-10-CM

## 2023-03-03 DIAGNOSIS — M25.50 ARTHRALGIA, UNSPECIFIED JOINT: ICD-10-CM

## 2023-03-03 DIAGNOSIS — R60.9 EDEMA, UNSPECIFIED TYPE: ICD-10-CM

## 2023-03-03 DIAGNOSIS — E11.10 DM (DIABETES MELLITUS) TYPE 2, UNCONTROLLED, WITH KETOACIDOSIS (HCC): ICD-10-CM

## 2023-03-03 DIAGNOSIS — R60.9 EDEMA, UNSPECIFIED TYPE: Primary | ICD-10-CM

## 2023-03-03 LAB
ALBUMIN SERPL BCP-MCNC: 3.7 G/DL (ref 3.5–5)
ALP SERPL-CCNC: 121 U/L (ref 46–116)
ALT SERPL W P-5'-P-CCNC: 80 U/L (ref 12–78)
ANION GAP SERPL CALCULATED.3IONS-SCNC: 6 MMOL/L (ref 4–13)
AST SERPL W P-5'-P-CCNC: 51 U/L (ref 5–45)
BASOPHILS # BLD AUTO: 0.05 THOUSANDS/ÂΜL (ref 0–0.1)
BASOPHILS NFR BLD AUTO: 1 % (ref 0–1)
BILIRUB SERPL-MCNC: 0.69 MG/DL (ref 0.2–1)
BUN SERPL-MCNC: 13 MG/DL (ref 5–25)
CALCIUM SERPL-MCNC: 9.3 MG/DL (ref 8.3–10.1)
CHLORIDE SERPL-SCNC: 105 MMOL/L (ref 96–108)
CHOLEST SERPL-MCNC: 188 MG/DL
CO2 SERPL-SCNC: 24 MMOL/L (ref 21–32)
CREAT SERPL-MCNC: 0.76 MG/DL (ref 0.6–1.3)
EOSINOPHIL # BLD AUTO: 0.09 THOUSAND/ÂΜL (ref 0–0.61)
EOSINOPHIL NFR BLD AUTO: 1 % (ref 0–6)
ERYTHROCYTE [DISTWIDTH] IN BLOOD BY AUTOMATED COUNT: 13.4 % (ref 11.6–15.1)
GFR SERPL CREATININE-BSD FRML MDRD: 104 ML/MIN/1.73SQ M
GLUCOSE P FAST SERPL-MCNC: 192 MG/DL (ref 65–99)
HCT VFR BLD AUTO: 52.4 % (ref 36.5–49.3)
HDLC SERPL-MCNC: 37 MG/DL
HGB BLD-MCNC: 17.4 G/DL (ref 12–17)
IMM GRANULOCYTES # BLD AUTO: 0.07 THOUSAND/UL (ref 0–0.2)
IMM GRANULOCYTES NFR BLD AUTO: 1 % (ref 0–2)
LDLC SERPL CALC-MCNC: 121 MG/DL (ref 0–100)
LYMPHOCYTES # BLD AUTO: 1.21 THOUSANDS/ÂΜL (ref 0.6–4.47)
LYMPHOCYTES NFR BLD AUTO: 16 % (ref 14–44)
MCH RBC QN AUTO: 31 PG (ref 26.8–34.3)
MCHC RBC AUTO-ENTMCNC: 33.2 G/DL (ref 31.4–37.4)
MCV RBC AUTO: 93 FL (ref 82–98)
MONOCYTES # BLD AUTO: 0.64 THOUSAND/ÂΜL (ref 0.17–1.22)
MONOCYTES NFR BLD AUTO: 9 % (ref 4–12)
NEUTROPHILS # BLD AUTO: 5.46 THOUSANDS/ÂΜL (ref 1.85–7.62)
NEUTS SEG NFR BLD AUTO: 72 % (ref 43–75)
NONHDLC SERPL-MCNC: 151 MG/DL
NRBC BLD AUTO-RTO: 0 /100 WBCS
PLATELET # BLD AUTO: 104 THOUSANDS/UL (ref 149–390)
PMV BLD AUTO: 14 FL (ref 8.9–12.7)
POTASSIUM SERPL-SCNC: 4 MMOL/L (ref 3.5–5.3)
PROT SERPL-MCNC: 7.2 G/DL (ref 6.4–8.4)
RBC # BLD AUTO: 5.62 MILLION/UL (ref 3.88–5.62)
SODIUM SERPL-SCNC: 135 MMOL/L (ref 135–147)
T4 FREE SERPL-MCNC: 0.91 NG/DL (ref 0.76–1.46)
TRIGL SERPL-MCNC: 152 MG/DL
TSH SERPL DL<=0.05 MIU/L-ACNC: 5.6 UIU/ML (ref 0.45–4.5)
WBC # BLD AUTO: 7.52 THOUSAND/UL (ref 4.31–10.16)

## 2023-03-04 LAB
EST. AVERAGE GLUCOSE BLD GHB EST-MCNC: 209 MG/DL
HBA1C MFR BLD: 8.9 %

## 2023-07-05 DIAGNOSIS — E11.8 TYPE 2 DIABETES MELLITUS WITH COMPLICATION, WITHOUT LONG-TERM CURRENT USE OF INSULIN (HCC): Primary | Chronic | ICD-10-CM

## 2023-07-05 RX ORDER — FLASH GLUCOSE SENSOR
KIT MISCELLANEOUS
Qty: 2 EACH | Refills: 5 | Status: SHIPPED | OUTPATIENT
Start: 2023-07-05

## 2023-07-05 RX ORDER — FLASH GLUCOSE SENSOR
KIT MISCELLANEOUS
COMMUNITY
End: 2023-07-05 | Stop reason: SDUPTHER

## 2023-08-03 ENCOUNTER — TRANSCRIBE ORDERS (OUTPATIENT)
Dept: LAB | Facility: CLINIC | Age: 53
End: 2023-08-03

## 2023-08-03 ENCOUNTER — APPOINTMENT (OUTPATIENT)
Dept: LAB | Facility: CLINIC | Age: 53
End: 2023-08-03
Payer: COMMERCIAL

## 2023-08-03 DIAGNOSIS — E11.69 TYPE 2 DIABETES MELLITUS WITH OTHER SPECIFIED COMPLICATION, UNSPECIFIED WHETHER LONG TERM INSULIN USE (HCC): ICD-10-CM

## 2023-08-03 DIAGNOSIS — E78.5 HYPERLIPIDEMIA, UNSPECIFIED HYPERLIPIDEMIA TYPE: ICD-10-CM

## 2023-08-03 DIAGNOSIS — I10 HYPERTENSION, UNSPECIFIED TYPE: ICD-10-CM

## 2023-08-03 DIAGNOSIS — E11.69 TYPE 2 DIABETES MELLITUS WITH OTHER SPECIFIED COMPLICATION, UNSPECIFIED WHETHER LONG TERM INSULIN USE (HCC): Primary | ICD-10-CM

## 2023-08-03 LAB
ALBUMIN SERPL BCP-MCNC: 3.6 G/DL (ref 3.5–5)
ALP SERPL-CCNC: 130 U/L (ref 46–116)
ALT SERPL W P-5'-P-CCNC: 93 U/L (ref 12–78)
ANION GAP SERPL CALCULATED.3IONS-SCNC: 6 MMOL/L
AST SERPL W P-5'-P-CCNC: 47 U/L (ref 5–45)
BASOPHILS # BLD AUTO: 0.06 THOUSANDS/ÂΜL (ref 0–0.1)
BASOPHILS NFR BLD AUTO: 1 % (ref 0–1)
BILIRUB SERPL-MCNC: 0.35 MG/DL (ref 0.2–1)
BUN SERPL-MCNC: 14 MG/DL (ref 5–25)
CALCIUM SERPL-MCNC: 9.3 MG/DL (ref 8.3–10.1)
CHLORIDE SERPL-SCNC: 105 MMOL/L (ref 96–108)
CHOLEST SERPL-MCNC: 171 MG/DL
CO2 SERPL-SCNC: 26 MMOL/L (ref 21–32)
CREAT SERPL-MCNC: 0.85 MG/DL (ref 0.6–1.3)
EOSINOPHIL # BLD AUTO: 0.14 THOUSAND/ÂΜL (ref 0–0.61)
EOSINOPHIL NFR BLD AUTO: 2 % (ref 0–6)
ERYTHROCYTE [DISTWIDTH] IN BLOOD BY AUTOMATED COUNT: 13.2 % (ref 11.6–15.1)
EST. AVERAGE GLUCOSE BLD GHB EST-MCNC: 214 MG/DL
GFR SERPL CREATININE-BSD FRML MDRD: 99 ML/MIN/1.73SQ M
GLUCOSE P FAST SERPL-MCNC: 230 MG/DL (ref 65–99)
HBA1C MFR BLD: 9.1 %
HCT VFR BLD AUTO: 52 % (ref 36.5–49.3)
HDLC SERPL-MCNC: 36 MG/DL
HGB BLD-MCNC: 17 G/DL (ref 12–17)
IMM GRANULOCYTES # BLD AUTO: 0.07 THOUSAND/UL (ref 0–0.2)
IMM GRANULOCYTES NFR BLD AUTO: 1 % (ref 0–2)
LDLC SERPL CALC-MCNC: 82 MG/DL (ref 0–100)
LG PLATELETS BLD QL SMEAR: PRESENT
LYMPHOCYTES # BLD AUTO: 1.3 THOUSANDS/ÂΜL (ref 0.6–4.47)
LYMPHOCYTES NFR BLD AUTO: 17 % (ref 14–44)
MCH RBC QN AUTO: 30.5 PG (ref 26.8–34.3)
MCHC RBC AUTO-ENTMCNC: 32.7 G/DL (ref 31.4–37.4)
MCV RBC AUTO: 93 FL (ref 82–98)
MONOCYTES # BLD AUTO: 0.64 THOUSAND/ÂΜL (ref 0.17–1.22)
MONOCYTES NFR BLD AUTO: 8 % (ref 4–12)
NEUTROPHILS # BLD AUTO: 5.37 THOUSANDS/ÂΜL (ref 1.85–7.62)
NEUTS SEG NFR BLD AUTO: 71 % (ref 43–75)
NONHDLC SERPL-MCNC: 135 MG/DL
NRBC BLD AUTO-RTO: 0 /100 WBCS
PLATELET # BLD AUTO: 96 THOUSANDS/UL (ref 149–390)
PLATELET BLD QL SMEAR: ABNORMAL
PMV BLD AUTO: 14.2 FL (ref 8.9–12.7)
POTASSIUM SERPL-SCNC: 4.1 MMOL/L (ref 3.5–5.3)
PROT SERPL-MCNC: 7.6 G/DL (ref 6.4–8.4)
RBC # BLD AUTO: 5.58 MILLION/UL (ref 3.88–5.62)
RBC MORPH BLD: NORMAL
SODIUM SERPL-SCNC: 137 MMOL/L (ref 135–147)
TRIGL SERPL-MCNC: 265 MG/DL
WBC # BLD AUTO: 7.58 THOUSAND/UL (ref 4.31–10.16)

## 2023-08-03 PROCEDURE — 36415 COLL VENOUS BLD VENIPUNCTURE: CPT

## 2023-08-03 PROCEDURE — 83036 HEMOGLOBIN GLYCOSYLATED A1C: CPT

## 2023-08-03 PROCEDURE — 80053 COMPREHEN METABOLIC PANEL: CPT

## 2023-08-03 PROCEDURE — 85025 COMPLETE CBC W/AUTO DIFF WBC: CPT

## 2023-08-03 PROCEDURE — 80061 LIPID PANEL: CPT

## 2023-08-07 ENCOUNTER — OFFICE VISIT (OUTPATIENT)
Dept: FAMILY MEDICINE CLINIC | Facility: CLINIC | Age: 53
End: 2023-08-07
Payer: COMMERCIAL

## 2023-08-07 VITALS
WEIGHT: 268.6 LBS | DIASTOLIC BLOOD PRESSURE: 62 MMHG | BODY MASS INDEX: 39.78 KG/M2 | TEMPERATURE: 97.3 F | HEIGHT: 69 IN | OXYGEN SATURATION: 98 % | HEART RATE: 83 BPM | SYSTOLIC BLOOD PRESSURE: 136 MMHG

## 2023-08-07 DIAGNOSIS — E11.8 TYPE 2 DIABETES MELLITUS WITH COMPLICATION, WITHOUT LONG-TERM CURRENT USE OF INSULIN (HCC): Chronic | ICD-10-CM

## 2023-08-07 DIAGNOSIS — E11.69 TYPE 2 DIABETES MELLITUS WITH OTHER SPECIFIED COMPLICATION, UNSPECIFIED WHETHER LONG TERM INSULIN USE (HCC): Primary | ICD-10-CM

## 2023-08-07 DIAGNOSIS — Z72.0 TOBACCO ABUSE: Chronic | ICD-10-CM

## 2023-08-07 DIAGNOSIS — E66.01 MORBID OBESITY WITH BMI OF 40.0-44.9, ADULT (HCC): Chronic | ICD-10-CM

## 2023-08-07 DIAGNOSIS — E78.2 MIXED HYPERLIPIDEMIA: ICD-10-CM

## 2023-08-07 DIAGNOSIS — E11.65 TYPE 2 DIABETES MELLITUS WITH HYPERGLYCEMIA, WITHOUT LONG-TERM CURRENT USE OF INSULIN (HCC): ICD-10-CM

## 2023-08-07 DIAGNOSIS — I10 ESSENTIAL HYPERTENSION: Chronic | ICD-10-CM

## 2023-08-07 DIAGNOSIS — Z12.11 SCREENING FOR COLON CANCER: ICD-10-CM

## 2023-08-07 PROCEDURE — 99214 OFFICE O/P EST MOD 30 MIN: CPT | Performed by: INTERNAL MEDICINE

## 2023-08-07 NOTE — ASSESSMENT & PLAN NOTE
He will get back to me on his current medication dosages. Medication list is inaccurate I am pretty sure. As he gets back to me we will make appropriate adjustments. May increase his Ozempic to 2 mg if available.   Lab Results   Component Value Date    HGBA1C 9.1 (H) 08/03/2023

## 2023-08-07 NOTE — PROGRESS NOTES
Name: Mora Wu      : 1970      MRN: [id number]  Encounter Provider: Elsa Gustafson DO  Encounter Date: 2023   Encounter department: [facility]    Assessment & Plan     1. Type 2 diabetes mellitus with other specified complication, unspecified whether long term insulin use (HCC)  -     semaglutide, 1 mg/dose, (Ozempic) 4 mg/3 mL injection pen; Inject 0.75 mL (1 mg total) under the skin once a week  -     Albumin / creatinine urine ratio; Future; Expected date: 2023  -     Hemoglobin A1C; Future; Expected date: 2023    2. Screening for colon cancer  Assessment & Plan:  Still has Cologuard at home, states she will do it. 3. Morbid obesity with BMI of 40.0-44.9, adult ([address])  -     Hemoglobin A1C; Future; Expected date: 2023    4. Essential hypertension  -     Albumin / creatinine urine ratio; Future; Expected date: 2023  -     CBC and differential; Future; Expected date: 2023  -     Comprehensive metabolic panel; Future; Expected date: 2023    5. Mixed hyperlipidemia  -     Lipid Panel with Direct LDL reflex; Future; Expected date: 2023    6. Tobacco abuse  Assessment & Plan:  Refusing to consider various options of smoking cessation. 7. Type 2 diabetes mellitus with hyperglycemia, without long-term current use of insulin ([address])    8. Type 2 diabetes mellitus with complication, without long-term current use of insulin ([address])  Assessment & Plan:  He will get back to me on his current medication dosages. Medication list is inaccurate I am pretty sure. As he gets back to me we will make appropriate adjustments. May increase his Ozempic to 2 mg if available. Lab Results   Component Value Date    HGBA1C 9.1 (H) 2023              Subjective      Here for follow-up for his diabetes. Notably poor control of sugar with A1c close to 9. Did not know his medications.   Was unsure of the dosages so we will need to check on that. Continues to smoke, reluctant to try Chantix. Remains on Ozempic. Denies polyuria polydipsia. Bowels and bladder been normal.  Noncompliant with Cologuard follow-up. Review of Systems   Constitutional: Negative for chills and fever. HENT: Negative for rhinorrhea and sore throat. Eyes: Negative for visual disturbance. Respiratory: Negative for cough and shortness of breath. Cardiovascular: Negative for chest pain and leg swelling. Gastrointestinal: Negative for abdominal pain, diarrhea, nausea and vomiting. Genitourinary: Negative for dysuria. Musculoskeletal: Negative for back pain and myalgias. Skin: Negative for rash. Neurological: Negative for dizziness and headaches. Psychiatric/Behavioral: Negative for confusion. All other systems reviewed and are negative. Current Outpatient Medications on File Prior to Visit   Medication Sig   • atorvastatin (LIPITOR) 20 mg tablet Take 20 mg by mouth daily   • Continuous Blood Gluc Sensor ([employer] Eneida 14 Day Sensor) MISC Change every 14 days   • Empagliflozin 25 MG TABS Take 25 mg by mouth every morning   • furosemide (LASIX) 80 mg tablet Take 80 mg by mouth daily   • glipiZIDE (GLUCOTROL XL) 2.5 mg 24 hr tablet Take 5 mg by mouth daily with breakfast    • glipiZIDE (GLUCOTROL XL) 2.5 mg 24 hr tablet Take 2.5 mg by mouth daily with dinner    • INVOKANA 300 MG TABS TAKE 1 TABLET BY ORAL ROUTE EVERY DAY BEFORE THE FIRST MEAL OF THE DAY   • irbesartan (AVAPRO) 300 mg tablet    • JANUMET XR  MG TB24 TAKE 2 TABLETS BY MOUTH DAILY WITH EVENING MEAL.  SWALLOW WHOLE. DO NOT CRUSH,CHEW AND / OR DIVIDE   • sertraline (ZOLOFT) 50 mg tablet Take 50 mg by mouth daily   • [DISCONTINUED] Semaglutide,0.25 or 0.5MG/DOS, 2 MG/1.5ML SOPN Inject under the skin   • nicotine (NICODERM CQ) 21 mg/24 hr TD 24 hr patch Place 1 patch on the skin daily   • potassium chloride (MICRO-K) 10 MEQ CR capsule Take 1 capsule (10 mEq total) by mouth daily for 30 days       Objective     /62 (BP Location: Left arm, Patient Position: Sitting, Cuff Size: Adult)   Pulse 83   Temp (!) 97.3 °F (36.3 °C) (Tympanic)   Ht 5' 8.5" (1.74 m)   Wt 122 kg (268 lb 9.6 oz)   SpO2 98%   BMI 40.25 kg/m²     Physical Exam  Constitutional:       Appearance: Normal appearance. He is obese. HENT:      Head: Normocephalic and atraumatic. Nose: No congestion or rhinorrhea. Eyes:      Extraocular Movements: Extraocular movements intact. Pupils: Pupils are equal, round, and reactive to light. Neck:      Vascular: No carotid bruit. Cardiovascular:      Rate and Rhythm: Normal rate and regular rhythm. Heart sounds: No murmur heard. Pulmonary:      Effort: No respiratory distress. Breath sounds: No wheezing. Chest:      Chest wall: No tenderness. Abdominal:      General: There is no distension. Tenderness: There is no abdominal tenderness. Hernia: No hernia is present. Musculoskeletal:         General: No swelling or tenderness. Right lower leg: Edema present. Left lower leg: Edema present. Comments: Chronic lower extremity edema   Lymphadenopathy:      Cervical: No cervical adenopathy. Skin:     Findings: No rash. Neurological:      General: No focal deficit present. Mental Status: He is alert and oriented to person, place, and time. Sensory: No sensory deficit.    Psychiatric:         Mood and Affect: Mood normal.         Behavior: Behavior normal.       Mary Alice Hannon DO

## 2023-08-23 ENCOUNTER — TELEPHONE (OUTPATIENT)
Dept: FAMILY MEDICINE CLINIC | Facility: CLINIC | Age: 53
End: 2023-08-23

## 2023-08-23 RX ORDER — GLIPIZIDE 5 MG/1
5 TABLET, FILM COATED, EXTENDED RELEASE ORAL DAILY
COMMUNITY
End: 2023-08-24 | Stop reason: SDUPTHER

## 2023-08-23 RX ORDER — FUROSEMIDE 80 MG
80 TABLET ORAL DAILY
COMMUNITY
End: 2023-08-23 | Stop reason: SDUPTHER

## 2023-08-23 RX ORDER — ATORVASTATIN CALCIUM 40 MG/1
TABLET, FILM COATED ORAL
COMMUNITY
Start: 2023-08-17

## 2023-08-23 NOTE — TELEPHONE ENCOUNTER
Phone call from patient. Dr. Sid Wells wanted him to call with a list of his medicines.   He is on:  Glipizide ER 5 mg 1 daily  Jardiance  25 mg 1 daily  Furosemide 80 mg 1 daily  Sertraline HCL 50 mg 1 daily  Irbesartan 300 mg 1 daily  Atorvastatin 40 mg 1 daily  Janumet XR 50/1000 2 daily  Potassium Chloride 10 meq 1 daily  Ozempic 1 mg 1 weekly

## 2023-08-24 DIAGNOSIS — E11.8 TYPE 2 DIABETES MELLITUS WITH COMPLICATION, WITHOUT LONG-TERM CURRENT USE OF INSULIN (HCC): Primary | Chronic | ICD-10-CM

## 2023-08-24 RX ORDER — GLIPIZIDE 5 MG/1
5 TABLET, FILM COATED, EXTENDED RELEASE ORAL DAILY
Qty: 90 TABLET | Refills: 3 | Status: SHIPPED | OUTPATIENT
Start: 2023-08-24

## 2023-08-25 ENCOUNTER — TELEPHONE (OUTPATIENT)
Dept: FAMILY MEDICINE CLINIC | Facility: CLINIC | Age: 53
End: 2023-08-25

## 2023-08-25 DIAGNOSIS — I10 ESSENTIAL HYPERTENSION: Primary | Chronic | ICD-10-CM

## 2023-08-25 DIAGNOSIS — L03.115 CELLULITIS OF LEG, RIGHT: ICD-10-CM

## 2023-08-25 RX ORDER — POTASSIUM CHLORIDE 750 MG/1
10 CAPSULE, EXTENDED RELEASE ORAL DAILY
Qty: 90 CAPSULE | Refills: 3 | Status: SHIPPED | OUTPATIENT
Start: 2023-08-25 | End: 2024-08-19

## 2023-08-25 RX ORDER — IRBESARTAN 300 MG/1
300 TABLET ORAL DAILY
Qty: 90 TABLET | Refills: 3 | Status: SHIPPED | OUTPATIENT
Start: 2023-08-25

## 2023-10-06 PROBLEM — Z12.11 SCREENING FOR COLON CANCER: Status: RESOLVED | Noted: 2023-08-07 | Resolved: 2023-10-06

## 2023-11-07 DIAGNOSIS — E11.69 TYPE 2 DIABETES MELLITUS WITH OTHER SPECIFIED COMPLICATION, UNSPECIFIED WHETHER LONG TERM INSULIN USE (HCC): ICD-10-CM

## 2023-11-17 DIAGNOSIS — I10 ESSENTIAL HYPERTENSION: Chronic | ICD-10-CM

## 2023-11-17 RX ORDER — IRBESARTAN 300 MG/1
300 TABLET ORAL DAILY
Qty: 90 TABLET | Refills: 3 | Status: SHIPPED | OUTPATIENT
Start: 2023-11-17

## 2023-11-24 ENCOUNTER — APPOINTMENT (OUTPATIENT)
Dept: LAB | Facility: CLINIC | Age: 53
End: 2023-11-24
Payer: COMMERCIAL

## 2023-11-24 DIAGNOSIS — E66.01 MORBID OBESITY WITH BMI OF 40.0-44.9, ADULT (HCC): Chronic | ICD-10-CM

## 2023-11-24 DIAGNOSIS — I10 ESSENTIAL HYPERTENSION: Chronic | ICD-10-CM

## 2023-11-24 DIAGNOSIS — E78.2 MIXED HYPERLIPIDEMIA: ICD-10-CM

## 2023-11-24 DIAGNOSIS — E11.69 TYPE 2 DIABETES MELLITUS WITH OTHER SPECIFIED COMPLICATION, UNSPECIFIED WHETHER LONG TERM INSULIN USE (HCC): ICD-10-CM

## 2023-11-24 LAB
ALBUMIN SERPL BCP-MCNC: 4.3 G/DL (ref 3.5–5)
ALP SERPL-CCNC: 112 U/L (ref 34–104)
ALT SERPL W P-5'-P-CCNC: 79 U/L (ref 7–52)
ANION GAP SERPL CALCULATED.3IONS-SCNC: 8 MMOL/L
AST SERPL W P-5'-P-CCNC: 47 U/L (ref 13–39)
BASOPHILS # BLD AUTO: 0.06 THOUSANDS/ÂΜL (ref 0–0.1)
BASOPHILS NFR BLD AUTO: 1 % (ref 0–1)
BILIRUB SERPL-MCNC: 0.48 MG/DL (ref 0.2–1)
BUN SERPL-MCNC: 15 MG/DL (ref 5–25)
CALCIUM SERPL-MCNC: 9.3 MG/DL (ref 8.4–10.2)
CHLORIDE SERPL-SCNC: 100 MMOL/L (ref 96–108)
CHOLEST SERPL-MCNC: 187 MG/DL
CO2 SERPL-SCNC: 28 MMOL/L (ref 21–32)
CREAT SERPL-MCNC: 0.78 MG/DL (ref 0.6–1.3)
CREAT UR-MCNC: 63.9 MG/DL
EOSINOPHIL # BLD AUTO: 0.18 THOUSAND/ÂΜL (ref 0–0.61)
EOSINOPHIL NFR BLD AUTO: 2 % (ref 0–6)
ERYTHROCYTE [DISTWIDTH] IN BLOOD BY AUTOMATED COUNT: 13.2 % (ref 11.6–15.1)
EST. AVERAGE GLUCOSE BLD GHB EST-MCNC: 217 MG/DL
GFR SERPL CREATININE-BSD FRML MDRD: 103 ML/MIN/1.73SQ M
GLUCOSE P FAST SERPL-MCNC: 185 MG/DL (ref 65–99)
HBA1C MFR BLD: 9.2 %
HCT VFR BLD AUTO: 54.3 % (ref 36.5–49.3)
HDLC SERPL-MCNC: 39 MG/DL
HGB BLD-MCNC: 18.3 G/DL (ref 12–17)
IMM GRANULOCYTES # BLD AUTO: 0.08 THOUSAND/UL (ref 0–0.2)
IMM GRANULOCYTES NFR BLD AUTO: 1 % (ref 0–2)
LDLC SERPL CALC-MCNC: 110 MG/DL (ref 0–100)
LYMPHOCYTES # BLD AUTO: 2.17 THOUSANDS/ÂΜL (ref 0.6–4.47)
LYMPHOCYTES NFR BLD AUTO: 23 % (ref 14–44)
MCH RBC QN AUTO: 31.3 PG (ref 26.8–34.3)
MCHC RBC AUTO-ENTMCNC: 33.7 G/DL (ref 31.4–37.4)
MCV RBC AUTO: 93 FL (ref 82–98)
MICROALBUMIN UR-MCNC: 1038.2 MG/L
MICROALBUMIN/CREAT 24H UR: 1625 MG/G CREATININE (ref 0–30)
MONOCYTES # BLD AUTO: 0.68 THOUSAND/ÂΜL (ref 0.17–1.22)
MONOCYTES NFR BLD AUTO: 7 % (ref 4–12)
NEUTROPHILS # BLD AUTO: 6.16 THOUSANDS/ÂΜL (ref 1.85–7.62)
NEUTS SEG NFR BLD AUTO: 66 % (ref 43–75)
NRBC BLD AUTO-RTO: 0 /100 WBCS
PLATELET # BLD AUTO: 109 THOUSANDS/UL (ref 149–390)
PMV BLD AUTO: 13.1 FL (ref 8.9–12.7)
POTASSIUM SERPL-SCNC: 4.5 MMOL/L (ref 3.5–5.3)
PROT SERPL-MCNC: 7.6 G/DL (ref 6.4–8.4)
RBC # BLD AUTO: 5.84 MILLION/UL (ref 3.88–5.62)
SODIUM SERPL-SCNC: 136 MMOL/L (ref 135–147)
TRIGL SERPL-MCNC: 189 MG/DL
WBC # BLD AUTO: 9.33 THOUSAND/UL (ref 4.31–10.16)

## 2023-11-24 PROCEDURE — 82043 UR ALBUMIN QUANTITATIVE: CPT

## 2023-11-24 PROCEDURE — 85025 COMPLETE CBC W/AUTO DIFF WBC: CPT

## 2023-11-24 PROCEDURE — 80061 LIPID PANEL: CPT

## 2023-11-24 PROCEDURE — 80053 COMPREHEN METABOLIC PANEL: CPT

## 2023-11-24 PROCEDURE — 83036 HEMOGLOBIN GLYCOSYLATED A1C: CPT

## 2023-11-24 PROCEDURE — 82570 ASSAY OF URINE CREATININE: CPT

## 2023-11-24 PROCEDURE — 36415 COLL VENOUS BLD VENIPUNCTURE: CPT

## 2023-12-01 ENCOUNTER — TELEPHONE (OUTPATIENT)
Dept: FAMILY MEDICINE CLINIC | Facility: CLINIC | Age: 53
End: 2023-12-01

## 2023-12-01 DIAGNOSIS — E11.8 TYPE 2 DIABETES MELLITUS WITH COMPLICATION, WITHOUT LONG-TERM CURRENT USE OF INSULIN (HCC): Primary | Chronic | ICD-10-CM

## 2023-12-01 NOTE — TELEPHONE ENCOUNTER
Notify Celeste that I increased his Ozempic from 1 mg to 2 mg and sent it to [facility] in [place]

## 2023-12-01 NOTE — TELEPHONE ENCOUNTER
Spoke to Luis Livingston and she said patient took last dose of Ozempic yesterday and will be due next week. She questioned if Dr Christopher Carolina decided to make any changes on his dose of Ozempic.  Please call Celeste back

## 2023-12-07 ENCOUNTER — OFFICE VISIT (OUTPATIENT)
Dept: FAMILY MEDICINE CLINIC | Facility: CLINIC | Age: 53
End: 2023-12-07
Payer: COMMERCIAL

## 2023-12-07 VITALS
OXYGEN SATURATION: 99 % | TEMPERATURE: 97.4 F | SYSTOLIC BLOOD PRESSURE: 130 MMHG | HEART RATE: 94 BPM | WEIGHT: 272.4 LBS | BODY MASS INDEX: 40.35 KG/M2 | DIASTOLIC BLOOD PRESSURE: 76 MMHG | HEIGHT: 69 IN

## 2023-12-07 DIAGNOSIS — D69.6 THROMBOCYTOPENIA (HCC): ICD-10-CM

## 2023-12-07 DIAGNOSIS — Z12.2 ENCOUNTER FOR SCREENING FOR LUNG CANCER: ICD-10-CM

## 2023-12-07 DIAGNOSIS — Z11.4 SCREENING FOR HIV (HUMAN IMMUNODEFICIENCY VIRUS): ICD-10-CM

## 2023-12-07 DIAGNOSIS — Z87.891 HISTORY OF SMOKING 30 OR MORE PACK YEARS: ICD-10-CM

## 2023-12-07 DIAGNOSIS — E78.2 MIXED HYPERLIPIDEMIA: ICD-10-CM

## 2023-12-07 DIAGNOSIS — E11.65 TYPE 2 DIABETES MELLITUS WITH HYPERGLYCEMIA, WITHOUT LONG-TERM CURRENT USE OF INSULIN (HCC): Primary | ICD-10-CM

## 2023-12-07 DIAGNOSIS — E11.69 TYPE 2 DIABETES MELLITUS WITH OTHER SPECIFIED COMPLICATION, UNSPECIFIED WHETHER LONG TERM INSULIN USE (HCC): Primary | ICD-10-CM

## 2023-12-07 DIAGNOSIS — I10 ESSENTIAL HYPERTENSION: Chronic | ICD-10-CM

## 2023-12-07 DIAGNOSIS — E66.01 MORBID OBESITY WITH BMI OF 40.0-44.9, ADULT (HCC): Chronic | ICD-10-CM

## 2023-12-07 DIAGNOSIS — Z12.11 SCREENING FOR COLON CANCER: ICD-10-CM

## 2023-12-07 DIAGNOSIS — Z72.0 TOBACCO ABUSE: Chronic | ICD-10-CM

## 2023-12-07 DIAGNOSIS — Z11.59 NEED FOR HEPATITIS C SCREENING TEST: ICD-10-CM

## 2023-12-07 DIAGNOSIS — F17.210 SMOKING GREATER THAN 20 PACK YEARS: ICD-10-CM

## 2023-12-07 DIAGNOSIS — E11.69 TYPE 2 DIABETES MELLITUS WITH OTHER SPECIFIED COMPLICATION, UNSPECIFIED WHETHER LONG TERM INSULIN USE (HCC): ICD-10-CM

## 2023-12-07 PROBLEM — R78.81 GRAM-POSITIVE BACTEREMIA: Status: RESOLVED | Noted: 2019-03-31 | Resolved: 2023-12-07

## 2023-12-07 PROBLEM — E87.6 HYPOKALEMIA: Status: RESOLVED | Noted: 2019-03-31 | Resolved: 2023-12-07

## 2023-12-07 PROBLEM — A41.9 SEPSIS (HCC): Status: RESOLVED | Noted: 2019-03-30 | Resolved: 2023-12-07

## 2023-12-07 PROCEDURE — 99214 OFFICE O/P EST MOD 30 MIN: CPT | Performed by: INTERNAL MEDICINE

## 2023-12-07 RX ORDER — ATORVASTATIN CALCIUM 80 MG/1
80 TABLET, FILM COATED ORAL DAILY
Qty: 90 TABLET | Refills: 3 | Status: SHIPPED | OUTPATIENT
Start: 2023-12-07

## 2023-12-07 RX ORDER — EMPAGLIFLOZIN 25 MG/1
25 TABLET, FILM COATED ORAL EVERY MORNING
Qty: 90 TABLET | Refills: 3 | Status: SHIPPED | OUTPATIENT
Start: 2023-12-07

## 2023-12-07 NOTE — ASSESSMENT & PLAN NOTE
Encouraged healthier lifestyle of diet, exercise, and to quit smoking. Unlikely to lose any significant weight.

## 2023-12-07 NOTE — PATIENT INSTRUCTIONS
May send to hematology next year . Pia Torre  Await next labs ( platelets)  Increased lipitor to 80 mg  Ordered CT chest   Need to complete cologard

## 2023-12-07 NOTE — PROGRESS NOTES
Name: Dmitry Rider      : 1970      MRN: [id number]  Encounter Provider: Cassia Edmond DO  Encounter Date: 2023   Encounter department: [address] PRIMARY CARE    Assessment & Plan     1. Type 2 diabetes mellitus with hyperglycemia, without long-term current use of insulin (HCC)  Assessment & Plan:  Increased his Ozempic to 2 mg dose every 7 days. He brings his sugars today which seem to be trending downward already. Other medications remain the same for now. Will recheck labs in 3 to 4 months. Lab Results   Component Value Date    HGBA1C 9.2 (H) 2023         2. Type 2 diabetes mellitus with other specified complication, unspecified whether long term insulin use ([address])    3. Essential hypertension  Assessment & Plan:  Continue current medical therapy. Orders:  -     Comprehensive metabolic panel; Future; Expected date: 2023  -     Platelet AB Screen (IGG & IGM); Future    4. Smoking greater than 20 pack years  -     CT lung screening program; Future; Expected date: 2024    5. Morbid obesity with BMI of 40.0-44.9, adult [facility])  Assessment & Plan:  Encouraged healthier lifestyle of diet, exercise, and to quit smoking. Unlikely to lose any significant weight. 6. Thrombocytopenia ([address])  Assessment & Plan: On a downward trend, will check platelet antibodies at this time. Recheck in 2 to 3 weeks. Orders:  -     CBC; Future; Expected date: 2024  -     Platelet AB Screen (IGG & IGM); Future    7. Encounter for screening for lung cancer  -     CT lung screening program; Future; Expected date: 2024    8. Need for hepatitis C screening test  -     Hepatitis C Antibody; Future    9. Screening for HIV (human immunodeficiency virus)  -     HIV 1/2 AG/AB w Reflex [facility] for 2 yr old and above; Future    10. Screening for colon cancer  Comments:  Refusing colonoscopy at this time or Cologuard.     11. Tobacco abuse  Assessment & Plan:  Has an excess of 30 pack years. 12. Mixed hyperlipidemia  -     atorvastatin (LIPITOR) 80 mg tablet; Take 1 tablet (80 mg total) by mouth daily    13. History of smoking 30 or more pack years      BMI Counseling: Body mass index is 40.82 kg/m². The BMI is above normal. Nutrition recommendations include moderation in carbohydrate intake. Exercise recommendations include exercising 3-5 times per week. Rationale for BMI follow-up plan is due to patient being overweight or obese. Tobacco Cessation Counseling: Tobacco cessation counseling was provided. The patient is sincerely urged to quit consumption of tobacco. He is not ready to quit tobacco. Medication options and side effects of medication discussed. Patient refused medication. Lung Cancer Screening Shared Decision Making: I discussed with him that he is a candidate for lung cancer CT screening. The following Shared Decision-Making points were covered:  Benefits of screening were discussed, including the rates of reduction in death from lung cancer and other causes. Harms of screening were reviewed, including false positive tests, radiation exposure levels, risks of invasive procedures, risks of complications of screening, and risk of overdiagnosis. I counseled on the importance of adherence to annual lung cancer LDCT screening, impact of co-morbidities, and ability or willingness to undergo diagnosis and treatment. I counseled on the importance of maintaining abstinence as a former smoker or was counseled on the importance of smoking cessation if a current smoker    Review of Eligibility Criteria: He meets all of the criteria for Lung Cancer Screening.   - He is 48 y.o.   - He has 20 pack year tobacco history and is a current smoker or has quit within the past 15 years  - He presents no signs or symptoms of lung cancer    After discussion, the patient decided to elect lung cancer screening. Subjective      She was here for follow-up.   Feels well, just stressed and feels busy all the time. Did not get his iris scan as recommended. Reviewed his blood work today revealing poor control as diabetes. Denies any weight gain or weight loss that he is aware of. Bowels and bladder been normal.      Review of Systems   Constitutional:  Negative for chills and fever. HENT:  Negative for rhinorrhea and sore throat. Eyes:  Negative for visual disturbance. Respiratory:  Positive for wheezing. Negative for cough and shortness of breath. Cardiovascular:  Positive for leg swelling. Negative for chest pain. Gastrointestinal:  Negative for abdominal pain, diarrhea, nausea and vomiting. Genitourinary:  Negative for dysuria. Musculoskeletal:  Positive for arthralgias and back pain. Negative for myalgias. Skin:  Negative for rash. Neurological:  Negative for dizziness and headaches. Psychiatric/Behavioral:  Negative for confusion. All other systems reviewed and are negative.       Current Outpatient Medications on File Prior to Visit   Medication Sig    Continuous Blood Gluc Sensor (FreespeeStyle Eneida 14 Day Sensor) MISC Change every 14 days    furosemide (LASIX) 80 mg tablet Take 80 mg by mouth daily    glipiZIDE (GLUCOTROL XL) 5 mg 24 hr tablet Take 1 tablet (5 mg total) by mouth daily    irbesartan (AVAPRO) 300 mg tablet TAKE 1 TABLET DAILY    JANUMET XR  MG TB24 Take 1 tablet by mouth 2 (two) times a day    potassium chloride (MICRO-K) 10 MEQ CR capsule TAKE 1 CAPSULE DAILY WITH FOOD    semaglutide, 2 mg/dose, (Ozempic) 8 mg/ mL injection pen Inject 0.75 mL (2 mg total) under the skin every 7 days    sertraline (ZOLOFT) 50 mg tablet Take 50 mg by mouth daily    [DISCONTINUED] atorvastatin (LIPITOR) 40 mg tablet     [DISCONTINUED] Empagliflozin 25 MG TABS Take 25 mg by mouth every morning       Objective     /76 (BP Location: Left arm, Patient Position: Sitting, Cuff Size: Adult)   Pulse 94   Temp (!) 97.4 °F (36.3 °C) (Tympanic)   Ht 5' 8.5" (1.74 m)   Wt 124 kg (272 lb 6.4 oz)   SpO2 99%   BMI 40.82 kg/m²     Physical Exam  Constitutional:       Appearance: He is obese. HENT:      Head: Normocephalic and atraumatic. Nose: No congestion or rhinorrhea. Eyes:      Extraocular Movements: Extraocular movements intact. Pupils: Pupils are equal, round, and reactive to light. Neck:      Vascular: No carotid bruit. Cardiovascular:      Rate and Rhythm: Normal rate and regular rhythm. Heart sounds: No murmur heard. Pulmonary:      Effort: No respiratory distress. Breath sounds: No wheezing. Chest:      Chest wall: No tenderness. Abdominal:      General: There is no distension. Tenderness: There is no abdominal tenderness. Hernia: No hernia is present. Musculoskeletal:         General: No swelling or tenderness. Right lower leg: Edema present. Left lower leg: Edema present. Comments: Bilateral lower extremity edema. Lymphadenopathy:      Cervical: No cervical adenopathy. Skin:     Findings: No rash. Neurological:      General: No focal deficit present. Mental Status: He is alert and oriented to person, place, and time. Sensory: No sensory deficit.    Psychiatric:         Mood and Affect: Mood normal.         Behavior: Behavior normal.       Sheila Rubi DO

## 2023-12-07 NOTE — ASSESSMENT & PLAN NOTE
Increased his Ozempic to 2 mg dose every 7 days. He brings his sugars today which seem to be trending downward already. Other medications remain the same for now. Will recheck labs in 3 to 4 months.   Lab Results   Component Value Date    HGBA1C 9.2 (H) 11/24/2023

## 2023-12-11 DIAGNOSIS — I10 ESSENTIAL HYPERTENSION: Primary | ICD-10-CM

## 2023-12-11 DIAGNOSIS — F41.9 ANXIETY: ICD-10-CM

## 2023-12-11 RX ORDER — FUROSEMIDE 80 MG
80 TABLET ORAL DAILY
Qty: 90 TABLET | Refills: 3 | Status: SHIPPED | OUTPATIENT
Start: 2023-12-11

## 2023-12-19 DIAGNOSIS — E11.8 TYPE 2 DIABETES MELLITUS WITH COMPLICATION, WITHOUT LONG-TERM CURRENT USE OF INSULIN (HCC): Primary | Chronic | ICD-10-CM

## 2023-12-19 RX ORDER — SITAGLIPTIN AND METFORMIN HYDROCHLORIDE 1000; 50 MG/1; MG/1
TABLET, FILM COATED, EXTENDED RELEASE ORAL
Qty: 180 TABLET | Refills: 3 | Status: SHIPPED | OUTPATIENT
Start: 2023-12-19

## 2023-12-20 DIAGNOSIS — E11.8 TYPE 2 DIABETES MELLITUS WITH COMPLICATION, WITHOUT LONG-TERM CURRENT USE OF INSULIN (HCC): Chronic | ICD-10-CM

## 2023-12-20 RX ORDER — FLASH GLUCOSE SENSOR
KIT MISCELLANEOUS
Qty: 6 EACH | Refills: 3 | Status: SHIPPED | OUTPATIENT
Start: 2023-12-20

## 2023-12-22 ENCOUNTER — APPOINTMENT (OUTPATIENT)
Dept: LAB | Facility: CLINIC | Age: 53
End: 2023-12-22
Payer: COMMERCIAL

## 2023-12-22 DIAGNOSIS — Z11.4 SCREENING FOR HIV (HUMAN IMMUNODEFICIENCY VIRUS): ICD-10-CM

## 2023-12-22 DIAGNOSIS — D69.6 THROMBOCYTOPENIA (HCC): ICD-10-CM

## 2023-12-22 DIAGNOSIS — Z11.59 NEED FOR HEPATITIS C SCREENING TEST: ICD-10-CM

## 2023-12-22 DIAGNOSIS — I10 ESSENTIAL HYPERTENSION: Primary | Chronic | ICD-10-CM

## 2023-12-22 LAB
ALBUMIN SERPL BCP-MCNC: 4.3 G/DL (ref 3.5–5)
ALP SERPL-CCNC: 105 U/L (ref 34–104)
ALT SERPL W P-5'-P-CCNC: 63 U/L (ref 7–52)
ANION GAP SERPL CALCULATED.3IONS-SCNC: 7 MMOL/L
AST SERPL W P-5'-P-CCNC: 44 U/L (ref 13–39)
BILIRUB SERPL-MCNC: 0.6 MG/DL (ref 0.2–1)
BUN SERPL-MCNC: 12 MG/DL (ref 5–25)
CALCIUM SERPL-MCNC: 8.6 MG/DL (ref 8.4–10.2)
CHLORIDE SERPL-SCNC: 104 MMOL/L (ref 96–108)
CO2 SERPL-SCNC: 25 MMOL/L (ref 21–32)
CREAT SERPL-MCNC: 0.77 MG/DL (ref 0.6–1.3)
GFR SERPL CREATININE-BSD FRML MDRD: 103 ML/MIN/1.73SQ M
GLUCOSE P FAST SERPL-MCNC: 165 MG/DL (ref 65–99)
POTASSIUM SERPL-SCNC: 4.5 MMOL/L (ref 3.5–5.3)
PROT SERPL-MCNC: 7.5 G/DL (ref 6.4–8.4)
SODIUM SERPL-SCNC: 136 MMOL/L (ref 135–147)

## 2023-12-22 PROCEDURE — 80053 COMPREHEN METABOLIC PANEL: CPT

## 2023-12-22 PROCEDURE — 86803 HEPATITIS C AB TEST: CPT

## 2023-12-22 PROCEDURE — 87389 HIV-1 AG W/HIV-1&-2 AB AG IA: CPT

## 2023-12-22 PROCEDURE — 36415 COLL VENOUS BLD VENIPUNCTURE: CPT

## 2023-12-23 LAB
HCV AB SER QL: NORMAL
HIV 1+2 AB+HIV1 P24 AG SERPL QL IA: NORMAL
HIV 2 AB SERPL QL IA: NORMAL
HIV1 AB SERPL QL IA: NORMAL
HIV1 P24 AG SERPL QL IA: NORMAL

## 2024-03-05 ENCOUNTER — HOSPITAL ENCOUNTER (OUTPATIENT)
Dept: CT IMAGING | Facility: HOSPITAL | Age: 54
Discharge: HOME | End: 2024-03-05
Payer: COMMERCIAL

## 2024-03-05 ENCOUNTER — PATIENT MESSAGE (OUTPATIENT)
Dept: FAMILY MEDICINE CLINIC | Facility: CLINIC | Age: 54
End: 2024-03-05

## 2024-03-05 DIAGNOSIS — F17.210 SMOKING GREATER THAN 20 PACK YEARS: ICD-10-CM

## 2024-03-05 DIAGNOSIS — Z12.2 ENCOUNTER FOR SCREENING FOR LUNG CANCER: ICD-10-CM

## 2024-03-05 PROCEDURE — 71271 CT THORAX LUNG CANCER SCR C-: CPT

## 2024-03-07 ENCOUNTER — OFFICE VISIT (OUTPATIENT)
Dept: FAMILY MEDICINE CLINIC | Facility: CLINIC | Age: 54
End: 2024-03-07
Payer: COMMERCIAL

## 2024-03-07 VITALS
BODY MASS INDEX: 39.84 KG/M2 | WEIGHT: 269 LBS | TEMPERATURE: 97.8 F | DIASTOLIC BLOOD PRESSURE: 68 MMHG | HEART RATE: 73 BPM | SYSTOLIC BLOOD PRESSURE: 126 MMHG | HEIGHT: 69 IN | OXYGEN SATURATION: 94 %

## 2024-03-07 DIAGNOSIS — E11.65 TYPE 2 DIABETES MELLITUS WITH HYPERGLYCEMIA, WITHOUT LONG-TERM CURRENT USE OF INSULIN (HCC): Primary | ICD-10-CM

## 2024-03-07 DIAGNOSIS — Z72.0 TOBACCO ABUSE: Chronic | ICD-10-CM

## 2024-03-07 DIAGNOSIS — E66.01 MORBID OBESITY WITH BMI OF 40.0-44.9, ADULT (HCC): Chronic | ICD-10-CM

## 2024-03-07 DIAGNOSIS — E11.8 TYPE 2 DIABETES MELLITUS WITH COMPLICATION, WITHOUT LONG-TERM CURRENT USE OF INSULIN (HCC): Chronic | ICD-10-CM

## 2024-03-07 DIAGNOSIS — Z12.11 SCREENING FOR COLON CANCER: ICD-10-CM

## 2024-03-07 DIAGNOSIS — S16.1XXA ACUTE STRAIN OF NECK MUSCLE, INITIAL ENCOUNTER: ICD-10-CM

## 2024-03-07 DIAGNOSIS — I10 ESSENTIAL HYPERTENSION: Chronic | ICD-10-CM

## 2024-03-07 DIAGNOSIS — F17.210 SMOKING GREATER THAN 20 PACK YEARS: ICD-10-CM

## 2024-03-07 LAB — SL AMB POCT HEMOGLOBIN AIC: 8.9 (ref ?–6.5)

## 2024-03-07 PROCEDURE — 83036 HEMOGLOBIN GLYCOSYLATED A1C: CPT | Performed by: INTERNAL MEDICINE

## 2024-03-07 PROCEDURE — 99214 OFFICE O/P EST MOD 30 MIN: CPT | Performed by: INTERNAL MEDICINE

## 2024-03-07 RX ORDER — GLIPIZIDE 10 MG/1
10 TABLET, FILM COATED, EXTENDED RELEASE ORAL DAILY
Qty: 90 TABLET | Refills: 3 | Status: SHIPPED | OUTPATIENT
Start: 2024-03-07

## 2024-03-07 NOTE — ASSESSMENT & PLAN NOTE
Continue encourage dietary restrictions, although I do not think he really follows any kind of diet.  Will increase glipizide from 5 to 10 mg.  Monitor for hypoglycemia.  Continue his other medications.  Lab Results   Component Value Date    HGBA1C 8.9 (A) 03/07/2024

## 2024-03-07 NOTE — ASSESSMENT & PLAN NOTE
Continue current regimen with the exception of increasing his glipizide from 5 mg to 10 mg.  If this fails to improve his sugars, may need to consider insulin.  Will check a C-peptide on him with his next blood work.  Lab Results   Component Value Date    HGBA1C 8.9 (A) 03/07/2024

## 2024-03-07 NOTE — ASSESSMENT & PLAN NOTE
Some improvement on his current regimen of Jardiance and Ozempic.  Remains on glipizide however.  May need the addition of a long-acting insulin if A1c does not improve on her next appointment.

## 2024-03-07 NOTE — PROGRESS NOTES
Name: Fidencio Forrester      : 1970      MRN: [id number]  Encounter Provider: Bossman Larson DO  Encounter Date: 3/7/2024   Encounter department: [facility] PRIMARY CARE    Assessment & Plan     1. Type 2 diabetes mellitus with hyperglycemia, without long-term current use of insulin (HCC)  Assessment & Plan:  Continue encourage dietary restrictions, although I do not think he really follows any kind of diet.  Will increase glipizide from 5 to 10 mg.  Monitor for hypoglycemia.  Continue his other medications.  Lab Results   Component Value Date    HGBA1C 8.9 (A) 2024       Orders:  -     IRIS Diabetic eye exam  -     POCT hemoglobin A1c  -     Hemoglobin A1C; Future; Expected date: 2024  -     Basic metabolic panel; Future; Expected date: 2024  -     CBC and Platelet; Future; Expected date: 2024    2. Type 2 diabetes mellitus with complication, without long-term current use of insulin (HCC)  Assessment & Plan:  Continue current regimen with the exception of increasing his glipizide from 5 mg to 10 mg.  If this fails to improve his sugars, may need to consider insulin.  Will check a C-peptide on him with his next blood work.  Lab Results   Component Value Date    HGBA1C 8.9 (A) 2024       Orders:  -     glipiZIDE (GLUCOTROL XL) 10 mg 24 hr tablet; Take 1 tablet (10 mg total) by mouth daily  -     C-peptide; Future; Expected date: 2024    3. Essential hypertension  Assessment & Plan:  Continue current medical therapy.  Blood pressure seems reasonably controlled.      4. Acute strain of neck muscle, initial encounter  Assessment & Plan:  Mostly in the left.  No radicular type pain down the arm.  Has no ear discomfort or facial discomfort.  Mostly in the cervical trapezius.  Likely a sprain or strain.  Will use heat, take Motrin as needed.  If symptoms are spaced may need physical therapy and/or chiropractic care.      5. Morbid obesity with BMI of 40.0-44.9,  adult ([facility])  Assessment & Plan:  Some improvement on his current regimen of Jardiance and Ozempic.  Remains on glipizide however.  May need the addition of a long-acting insulin if A1c does not improve on her next appointment.      6. Tobacco abuse  Assessment & Plan:  Continue yearly low-dose CAT scan.  Continues to smoke.  Continue encourage smoking cessation and offered the appropriate medications for him, he declines at this time.      7. Smoking greater than 20 pack years    8. Screening for colon cancer  Comments:  Steff has been sent.  He has a box at home.  Will trial again.  Encouraged to follow-up, note he was adopted so we have no family history        Tobacco Cessation Counseling: Tobacco cessation counseling was not provided. The patient is sincerely urged to quit consumption of tobacco. He is not ready to quit tobacco. Medication options and side effects of medication discussed. Patient agreed to medication.         Subjective      Imtiaz is not feeling too bad.  States he has a lot of stress in his life.  Discussed smoking cessation which she declines at this time.  He has been compliant with his medications he states.  Sugars remain elevated.  He is better at testing his sugars but clearly, even though he has a freestyle alexa he is not checking them nearly enough.  Most of his sugars remain in the 200 range.  Has no fever or chills.  No bowel issues.  No side effects from his medications.  He has lost a little weight.  He notes that he has some left neck discomfort.  This is relatively new.  Has been taking Motrin with some relief.  Not sure if he just slept on it wrong.  Does not remember injury.      Review of Systems   Constitutional:  Negative for chills and fever.   HENT:  Negative for rhinorrhea and sore throat.    Eyes:  Negative for visual disturbance.   Respiratory:  Negative for cough and shortness of breath.    Cardiovascular:  Negative for chest pain and leg swelling.  "  Gastrointestinal:  Negative for abdominal pain, diarrhea, nausea and vomiting.   Genitourinary:  Negative for dysuria.   Musculoskeletal:  Positive for arthralgias, back pain and myalgias.   Skin:  Negative for rash.   Neurological:  Negative for dizziness and headaches.   Psychiatric/Behavioral:  Negative for confusion.    All other systems reviewed and are negative.      Current Outpatient Medications on File Prior to Visit   Medication Sig    atorvastatin (LIPITOR) 80 mg tablet Take 1 tablet (80 mg total) by mouth daily    Continuous Blood Gluc Sensor (FreeStyle Eneida 14 Day Sensor) MISC CHANGE EVERY 14 DAYS    furosemide (LASIX) 80 mg tablet TAKE 1 TABLET DAILY    irbesartan (AVAPRO) 300 mg tablet TAKE 1 TABLET DAILY    Janumet XR  MG TB24 TAKE 2 TABLETS DAILY WITH EVENING MEAL, SWALLOW WHOLE, DO NOT CRUSH, CHEW AND/OR DIVIDE    Jardiance 25 MG TABS TAKE 1 TABLET DAILY IN THE MORNING    potassium chloride (MICRO-K) 10 MEQ CR capsule TAKE 1 CAPSULE DAILY WITH FOOD    semaglutide, 2 mg/dose, (Ozempic) 8 mg/ mL injection pen Inject 0.75 mL (2 mg total) under the skin every 7 days    sertraline (ZOLOFT) 50 mg tablet TAKE 1 TABLET DAILY    [DISCONTINUED] glipiZIDE (GLUCOTROL XL) 5 mg 24 hr tablet Take 1 tablet (5 mg total) by mouth daily       Objective     /68 (BP Location: Left arm, Patient Position: Sitting, Cuff Size: Large)   Pulse 73   Temp 97.8 °F (36.6 °C) (Tympanic)   Ht 5' 8.5\" (1.74 m)   Wt 122 kg (269 lb)   SpO2 94%   BMI 40.31 kg/m²     Physical Exam  Constitutional:       Appearance: Normal appearance. He is obese.   HENT:      Head: Normocephalic and atraumatic.      Nose: No congestion or rhinorrhea.      Mouth/Throat:      Mouth: Mucous membranes are moist.   Eyes:      Extraocular Movements: Extraocular movements intact.      Pupils: Pupils are equal, round, and reactive to light.   Neck:      Vascular: No carotid bruit.   Cardiovascular:      Rate and Rhythm: Normal rate and " regular rhythm.      Pulses: Normal pulses. no weak pulses.           Dorsalis pedis pulses are 2+ on the right side and 2+ on the left side.        Posterior tibial pulses are 2+ on the right side and 2+ on the left side.      Heart sounds: Normal heart sounds. No murmur heard.  Pulmonary:      Effort: No respiratory distress.      Breath sounds: No wheezing.   Chest:      Chest wall: No tenderness.   Abdominal:      General: There is no distension.      Tenderness: There is no abdominal tenderness.      Hernia: No hernia is present.   Musculoskeletal:         General: Tenderness present. No swelling.      Cervical back: Normal range of motion.      Right lower leg: Edema present.      Left lower leg: Edema present.      Comments: Tenderness over the cervical trapezius.   Feet:      Right foot:      Skin integrity: Dry skin present. No ulcer, skin breakdown, erythema, warmth or callus.      Left foot:      Skin integrity: Dry skin present. No ulcer, skin breakdown, erythema, warmth or callus.   Lymphadenopathy:      Cervical: No cervical adenopathy.   Skin:     Findings: No rash.   Neurological:      General: No focal deficit present.      Mental Status: He is alert and oriented to person, place, and time.      Sensory: No sensory deficit.   Psychiatric:         Mood and Affect: Mood normal.         Behavior: Behavior normal.     Diabetic Foot Exam    Patient's shoes and socks removed.    Right Foot/Ankle   Right Foot Inspection  Skin Exam: skin normal, skin intact and dry skin. No warmth, no callus, no erythema, no maceration, no abnormal color, no pre-ulcer, no ulcer and no callus.     Toe Exam: ROM and strength within normal limits and right toe deformity.     Sensory   Monofilament testing: intact    Vascular  Capillary refills: < 3 seconds  The right DP pulse is 2+. The right PT pulse is 2+.     Left Foot/Ankle  Left Foot Inspection  Skin Exam: skin normal, skin intact and dry skin. No warmth, no erythema, no  maceration, normal color, no pre-ulcer, no ulcer and no callus.     Toe Exam: ROM and strength within normal limits and left toe deformity.     Sensory   Monofilament testing: intact    Vascular  Capillary refills: < 3 seconds  The left DP pulse is 2+. The left PT pulse is 2+.     Assign Risk Category  Deformity present  No loss of protective sensation  No weak pulses  Risk: 0    Bossman Larson DO

## 2024-03-07 NOTE — ASSESSMENT & PLAN NOTE
Continue yearly low-dose CAT scan.  Continues to smoke.  Continue encourage smoking cessation and offered the appropriate medications for him, he declines at this time.

## 2024-05-05 DIAGNOSIS — E11.8 TYPE 2 DIABETES MELLITUS WITH COMPLICATION, WITHOUT LONG-TERM CURRENT USE OF INSULIN (HCC): Chronic | ICD-10-CM

## 2024-05-07 RX ORDER — SEMAGLUTIDE 2.68 MG/ML
INJECTION, SOLUTION SUBCUTANEOUS
Qty: 9 ML | Refills: 5 | Status: SHIPPED | OUTPATIENT
Start: 2024-05-07

## 2024-07-05 ENCOUNTER — APPOINTMENT (OUTPATIENT)
Dept: LAB | Facility: CLINIC | Age: 54
End: 2024-07-05
Payer: COMMERCIAL

## 2024-07-05 DIAGNOSIS — E11.65 TYPE 2 DIABETES MELLITUS WITH HYPERGLYCEMIA, WITHOUT LONG-TERM CURRENT USE OF INSULIN (HCC): ICD-10-CM

## 2024-07-05 DIAGNOSIS — E11.8 TYPE 2 DIABETES MELLITUS WITH COMPLICATION, WITHOUT LONG-TERM CURRENT USE OF INSULIN (HCC): Chronic | ICD-10-CM

## 2024-07-05 LAB
ANION GAP SERPL CALCULATED.3IONS-SCNC: 12 MMOL/L (ref 4–13)
BUN SERPL-MCNC: 15 MG/DL (ref 5–25)
CALCIUM SERPL-MCNC: 9.7 MG/DL (ref 8.4–10.2)
CHLORIDE SERPL-SCNC: 99 MMOL/L (ref 96–108)
CO2 SERPL-SCNC: 26 MMOL/L (ref 21–32)
CREAT SERPL-MCNC: 0.77 MG/DL (ref 0.6–1.3)
ERYTHROCYTE [DISTWIDTH] IN BLOOD BY AUTOMATED COUNT: 12.8 % (ref 11.6–15.1)
EST. AVERAGE GLUCOSE BLD GHB EST-MCNC: 206 MG/DL
GFR SERPL CREATININE-BSD FRML MDRD: 102 ML/MIN/1.73SQ M
GLUCOSE P FAST SERPL-MCNC: 166 MG/DL (ref 65–99)
HBA1C MFR BLD: 8.8 %
HCT VFR BLD AUTO: 52.2 % (ref 36.5–49.3)
HGB BLD-MCNC: 17 G/DL (ref 12–17)
MCH RBC QN AUTO: 30.8 PG (ref 26.8–34.3)
MCHC RBC AUTO-ENTMCNC: 32.6 G/DL (ref 31.4–37.4)
MCV RBC AUTO: 95 FL (ref 82–98)
POTASSIUM SERPL-SCNC: 4.4 MMOL/L (ref 3.5–5.3)
RBC # BLD AUTO: 5.52 MILLION/UL (ref 3.88–5.62)
SODIUM SERPL-SCNC: 137 MMOL/L (ref 135–147)
WBC # BLD AUTO: 9.04 THOUSAND/UL (ref 4.31–10.16)

## 2024-07-05 PROCEDURE — 80048 BASIC METABOLIC PNL TOTAL CA: CPT

## 2024-07-05 PROCEDURE — 36415 COLL VENOUS BLD VENIPUNCTURE: CPT

## 2024-07-05 PROCEDURE — 83036 HEMOGLOBIN GLYCOSYLATED A1C: CPT

## 2024-07-05 PROCEDURE — 84681 ASSAY OF C-PEPTIDE: CPT

## 2024-07-07 LAB — C PEPTIDE SERPL-MCNC: 4.1 NG/ML (ref 1.1–4.4)

## 2024-07-18 ENCOUNTER — OFFICE VISIT (OUTPATIENT)
Dept: FAMILY MEDICINE CLINIC | Facility: CLINIC | Age: 54
End: 2024-07-18
Payer: COMMERCIAL

## 2024-07-18 VITALS
TEMPERATURE: 97 F | DIASTOLIC BLOOD PRESSURE: 76 MMHG | BODY MASS INDEX: 40.7 KG/M2 | HEIGHT: 69 IN | SYSTOLIC BLOOD PRESSURE: 120 MMHG | WEIGHT: 274.8 LBS | HEART RATE: 69 BPM | OXYGEN SATURATION: 98 %

## 2024-07-18 DIAGNOSIS — I10 ESSENTIAL HYPERTENSION: Chronic | ICD-10-CM

## 2024-07-18 DIAGNOSIS — F17.210 SMOKING GREATER THAN 20 PACK YEARS: ICD-10-CM

## 2024-07-18 DIAGNOSIS — E66.01 MORBID OBESITY WITH BMI OF 40.0-44.9, ADULT (HCC): Chronic | ICD-10-CM

## 2024-07-18 DIAGNOSIS — E11.8 TYPE 2 DIABETES MELLITUS WITH COMPLICATION, WITHOUT LONG-TERM CURRENT USE OF INSULIN (HCC): Primary | ICD-10-CM

## 2024-07-18 DIAGNOSIS — E78.2 MIXED HYPERLIPIDEMIA: ICD-10-CM

## 2024-07-18 PROCEDURE — 99214 OFFICE O/P EST MOD 30 MIN: CPT | Performed by: INTERNAL MEDICINE

## 2024-07-18 RX ORDER — GLIPIZIDE 2.5 MG/1
TABLET, EXTENDED RELEASE ORAL
Qty: 90 TABLET | Refills: 3 | Status: SHIPPED | OUTPATIENT
Start: 2024-07-18

## 2024-07-18 NOTE — ASSESSMENT & PLAN NOTE
For now, will increase glipizide to 10mg in am and 2.5 in pm.  Lab Results   Component Value Date    HGBA1C 8.8 (H) 07/05/2024

## 2024-07-18 NOTE — PROGRESS NOTES
Ambulatory Visit  Name: Fidencio Forrester      : 1970      MRN: [id number]  Encounter Provider: Bossman Larson DO  Encounter Date: 2024   Encounter department: [facility] PRIMARY CARE    Assessment & Plan   1. Type 2 diabetes mellitus with complication, without long-term current use of insulin (HCC)  Assessment & Plan:  For now, will increase glipizide to 10mg in am and 2.5 in pm.  Lab Results   Component Value Date    HGBA1C 8.8 (H) 2024     Orders:  -     IRIS Diabetic eye exam  -     glipiZIDE (GLUCOTROL XL) 2.5 mg 24 hr tablet; Take with evening meal in addition to your 10mg in am.  -     Hemoglobin A1C; Future; Expected date: 10/18/2024  -     Comprehensive metabolic panel; Future; Expected date: 10/18/2024  -     CBC and differential; Future; Expected date: 10/18/2024  2. Essential hypertension  Assessment & Plan:  Blood pressure is under excellent control.  3. Smoking greater than 20 pack years  Assessment & Plan:  Continue to offer chantix and the like and he refuses.  Continue wit yearly LDCT of lungs.  4. Morbid obesity with BMI of 40.0-44.9, adult ([facility])  Assessment & Plan:  Unfortunately very sedentary, smoker and poor exercise and diet choices.  5. Mixed hyperlipidemia  Assessment & Plan:  Remains on high intensity statins noting his excessive coronary calcifications.  Orders:  -     Lipid Panel with Direct LDL reflex; Future; Expected date: 10/18/2024      Depression Screening and Follow-up Plan: Patient was screened for depression during today's encounter. They screened negative with a PHQ-2 score of 1.      History of Present Illness     Imtiaz is here in follow-up.  There has been absolutely no change in his A1c over the last 3 to 4 months, remains about 8.8.  Denies chest pain, denies shortness of breath.  Continues to smoke.  Lifestyle remains essentially sedentary.  Recent return from [place]  Interesting overall in the [place], his diet was different  "and his sugars were better controlled as noted by his CGM.  He continues on Ozempic at 2 mg, not much movement in his weight.  Is really on some maximal medications for his diabetes, and is poorly controlled.  Recent low-dose CAT scan reveals excessive calcification in his coronary vessels.  Remains on high intensity statins.        Review of Systems   Constitutional:  Negative for chills and fever.   HENT:  Negative for rhinorrhea and sore throat.    Eyes:  Negative for visual disturbance.   Respiratory:  Negative for cough and shortness of breath.    Cardiovascular:  Negative for chest pain and leg swelling.   Gastrointestinal:  Negative for abdominal pain, diarrhea, nausea and vomiting.   Genitourinary:  Negative for dysuria.   Musculoskeletal:  Positive for arthralgias. Negative for back pain and myalgias.   Skin:  Negative for rash.   Neurological:  Negative for dizziness and headaches.   Psychiatric/Behavioral:  Negative for confusion.    All other systems reviewed and are negative.      Objective     /76 (BP Location: Right arm, Patient Position: Sitting, Cuff Size: Large)   Pulse 69   Temp (!) 97 °F (36.1 °C) (Tympanic)   Ht 5' 8.5\" (1.74 m)   Wt 125 kg (274 lb 12.8 oz)   SpO2 98%   BMI 41.18 kg/m²     Physical Exam  Vitals and nursing note reviewed.   Constitutional:       General: He is not in acute distress.     Appearance: He is well-developed. He is obese.   HENT:      Head: Normocephalic and atraumatic.   Eyes:      Conjunctiva/sclera: Conjunctivae normal.   Cardiovascular:      Rate and Rhythm: Normal rate and regular rhythm.      Pulses: Normal pulses.      Heart sounds: Normal heart sounds. No murmur heard.  Pulmonary:      Effort: Pulmonary effort is normal. No respiratory distress.      Breath sounds: Wheezing present.   Abdominal:      Palpations: Abdomen is soft.      Tenderness: There is no abdominal tenderness.   Musculoskeletal:         General: No swelling.      Cervical back: " Neck supple.      Right lower leg: Edema present.      Left lower leg: Edema present.   Skin:     General: Skin is warm and dry.      Capillary Refill: Capillary refill takes less than 2 seconds.   Neurological:      General: No focal deficit present.      Mental Status: He is alert and oriented to person, place, and time.   Psychiatric:         Mood and Affect: Mood normal.       Administrative Statements

## 2024-10-23 DIAGNOSIS — L03.115 CELLULITIS OF LEG, RIGHT: ICD-10-CM

## 2024-10-23 RX ORDER — POTASSIUM CHLORIDE 750 MG/1
10 CAPSULE, EXTENDED RELEASE ORAL DAILY
Qty: 90 CAPSULE | Refills: 1 | Status: SHIPPED | OUTPATIENT
Start: 2024-10-23

## 2024-11-08 DIAGNOSIS — E11.8 TYPE 2 DIABETES MELLITUS WITH COMPLICATION, WITHOUT LONG-TERM CURRENT USE OF INSULIN (HCC): Chronic | ICD-10-CM

## 2024-11-08 RX ORDER — FLASH GLUCOSE SENSOR
KIT MISCELLANEOUS
Qty: 6 EACH | Refills: 1 | Status: SHIPPED | OUTPATIENT
Start: 2024-11-08

## 2024-11-11 DIAGNOSIS — I10 ESSENTIAL HYPERTENSION: Chronic | ICD-10-CM

## 2024-11-12 RX ORDER — IRBESARTAN 300 MG/1
300 TABLET ORAL DAILY
Qty: 90 TABLET | Refills: 1 | Status: SHIPPED | OUTPATIENT
Start: 2024-11-12

## 2024-11-13 DIAGNOSIS — E78.2 MIXED HYPERLIPIDEMIA: ICD-10-CM

## 2024-11-13 RX ORDER — ATORVASTATIN CALCIUM 80 MG/1
80 TABLET, FILM COATED ORAL DAILY
Qty: 90 TABLET | Refills: 1 | Status: SHIPPED | OUTPATIENT
Start: 2024-11-13

## 2024-12-02 DIAGNOSIS — F41.9 ANXIETY: ICD-10-CM

## 2024-12-02 DIAGNOSIS — E11.69 TYPE 2 DIABETES MELLITUS WITH OTHER SPECIFIED COMPLICATION, UNSPECIFIED WHETHER LONG TERM INSULIN USE (HCC): ICD-10-CM

## 2024-12-02 DIAGNOSIS — I10 ESSENTIAL HYPERTENSION: ICD-10-CM

## 2024-12-03 RX ORDER — EMPAGLIFLOZIN 25 MG/1
25 TABLET, FILM COATED ORAL EVERY MORNING
Qty: 90 TABLET | Refills: 1 | Status: SHIPPED | OUTPATIENT
Start: 2024-12-03

## 2024-12-03 RX ORDER — FUROSEMIDE 80 MG/1
80 TABLET ORAL DAILY
Qty: 90 TABLET | Refills: 1 | Status: SHIPPED | OUTPATIENT
Start: 2024-12-03

## 2024-12-08 DIAGNOSIS — E11.8 TYPE 2 DIABETES MELLITUS WITH COMPLICATION, WITHOUT LONG-TERM CURRENT USE OF INSULIN (HCC): Chronic | ICD-10-CM

## 2024-12-09 RX ORDER — GLIPIZIDE 10 MG/1
10 TABLET, FILM COATED, EXTENDED RELEASE ORAL DAILY
Qty: 90 TABLET | Refills: 1 | Status: SHIPPED | OUTPATIENT
Start: 2024-12-09 | End: 2024-12-12 | Stop reason: SDUPTHER

## 2024-12-12 DIAGNOSIS — E11.8 TYPE 2 DIABETES MELLITUS WITH COMPLICATION, WITHOUT LONG-TERM CURRENT USE OF INSULIN (HCC): Chronic | ICD-10-CM

## 2024-12-12 RX ORDER — GLIPIZIDE 10 MG/1
10 TABLET, FILM COATED, EXTENDED RELEASE ORAL DAILY
Qty: 100 TABLET | Refills: 3 | Status: SHIPPED | OUTPATIENT
Start: 2024-12-12 | End: 2024-12-18 | Stop reason: SDUPTHER

## 2024-12-12 NOTE — TELEPHONE ENCOUNTER
Pt sent [employer] message stating that Glipizide was sent to [facility] but pt will be short before medication arrives.  Asking for short term refill to be sent to local pharmacy.  Preferred pharmacy pended.  Please advise.

## 2024-12-13 DIAGNOSIS — E11.8 TYPE 2 DIABETES MELLITUS WITH COMPLICATION, WITHOUT LONG-TERM CURRENT USE OF INSULIN (HCC): Chronic | ICD-10-CM

## 2024-12-13 RX ORDER — SITAGLIPTIN AND METFORMIN HYDROCHLORIDE 1000; 50 MG/1; MG/1
TABLET, FILM COATED, EXTENDED RELEASE ORAL
Qty: 180 TABLET | Refills: 1 | Status: SHIPPED | OUTPATIENT
Start: 2024-12-13

## 2024-12-18 ENCOUNTER — TELEPHONE (OUTPATIENT)
Dept: FAMILY MEDICINE CLINIC | Facility: CLINIC | Age: 54
End: 2024-12-18

## 2024-12-18 DIAGNOSIS — E11.8 TYPE 2 DIABETES MELLITUS WITH COMPLICATION, WITHOUT LONG-TERM CURRENT USE OF INSULIN (HCC): Chronic | ICD-10-CM

## 2024-12-18 RX ORDER — GLIPIZIDE 10 MG/1
10 TABLET, FILM COATED, EXTENDED RELEASE ORAL DAILY
Qty: 30 TABLET | Refills: 3 | Status: SHIPPED | OUTPATIENT
Start: 2024-12-18

## 2024-12-18 RX ORDER — GLIPIZIDE 2.5 MG/1
TABLET, EXTENDED RELEASE ORAL
Qty: 30 TABLET | Refills: 3 | Status: SHIPPED | OUTPATIENT
Start: 2024-12-18

## 2025-01-07 ENCOUNTER — TELEPHONE (OUTPATIENT)
Dept: FAMILY MEDICINE CLINIC | Facility: CLINIC | Age: 55
End: 2025-01-07

## 2025-01-07 DIAGNOSIS — E11.8 TYPE 2 DIABETES MELLITUS WITH COMPLICATION, WITHOUT LONG-TERM CURRENT USE OF INSULIN (HCC): Chronic | ICD-10-CM

## 2025-01-07 RX ORDER — GLIPIZIDE 2.5 MG/1
TABLET, EXTENDED RELEASE ORAL
Qty: 90 TABLET | Refills: 3 | Status: SHIPPED | OUTPATIENT
Start: 2025-01-07

## 2025-03-11 ENCOUNTER — TELEPHONE (OUTPATIENT)
Dept: FAMILY MEDICINE CLINIC | Facility: CLINIC | Age: 55
End: 2025-03-11

## 2025-03-11 NOTE — TELEPHONE ENCOUNTER
Left message for patient to call me back and let me know if he was ever able to get in to see an eye doctor. Told him to call and ask for

## 2025-04-21 DIAGNOSIS — L03.115 CELLULITIS OF LEG, RIGHT: ICD-10-CM

## 2025-04-21 RX ORDER — POTASSIUM CHLORIDE 750 MG/1
10 CAPSULE, EXTENDED RELEASE ORAL DAILY
Qty: 90 CAPSULE | Refills: 1 | Status: SHIPPED | OUTPATIENT
Start: 2025-04-21

## 2025-05-08 DIAGNOSIS — I10 ESSENTIAL HYPERTENSION: Chronic | ICD-10-CM

## 2025-05-08 RX ORDER — IRBESARTAN 300 MG/1
300 TABLET ORAL DAILY
Qty: 90 TABLET | Refills: 0 | Status: SHIPPED | OUTPATIENT
Start: 2025-05-08

## 2025-05-08 NOTE — TELEPHONE ENCOUNTER
Patient needs an appointment. Please contact the patient to schedule an appointment. Last office visit: 7/18/24

## 2025-05-12 DIAGNOSIS — E78.2 MIXED HYPERLIPIDEMIA: ICD-10-CM

## 2025-05-12 RX ORDER — ATORVASTATIN CALCIUM 80 MG/1
80 TABLET, FILM COATED ORAL DAILY
Qty: 90 TABLET | Refills: 0 | Status: SHIPPED | OUTPATIENT
Start: 2025-05-12

## 2025-05-18 DIAGNOSIS — E11.8 TYPE 2 DIABETES MELLITUS WITH COMPLICATION, WITHOUT LONG-TERM CURRENT USE OF INSULIN (HCC): Chronic | ICD-10-CM

## 2025-05-19 RX ORDER — FLASH GLUCOSE SENSOR
KIT MISCELLANEOUS
Refills: 1 | OUTPATIENT
Start: 2025-05-19

## 2025-05-20 ENCOUNTER — PATIENT MESSAGE (OUTPATIENT)
Dept: FAMILY MEDICINE CLINIC | Facility: CLINIC | Age: 55
End: 2025-05-20

## 2025-05-20 DIAGNOSIS — E11.8 TYPE 2 DIABETES MELLITUS WITH COMPLICATION, WITHOUT LONG-TERM CURRENT USE OF INSULIN (HCC): Chronic | ICD-10-CM

## 2025-05-21 ENCOUNTER — TELEPHONE (OUTPATIENT)
Age: 55
End: 2025-05-21

## 2025-05-21 DIAGNOSIS — E11.65 TYPE 2 DIABETES MELLITUS WITH HYPERGLYCEMIA, WITHOUT LONG-TERM CURRENT USE OF INSULIN (HCC): Primary | ICD-10-CM

## 2025-05-21 RX ORDER — ACYCLOVIR 400 MG/1
1 TABLET ORAL ONCE
Qty: 1 EACH | Refills: 0 | Status: SHIPPED | OUTPATIENT
Start: 2025-05-21 | End: 2025-05-21

## 2025-05-21 RX ORDER — ACYCLOVIR 400 MG/1
1 TABLET ORAL
Qty: 9 EACH | Refills: 3 | Status: SHIPPED | OUTPATIENT
Start: 2025-05-21

## 2025-05-21 RX ORDER — FLASH GLUCOSE SENSOR
KIT MISCELLANEOUS
Refills: 1 | OUTPATIENT
Start: 2025-05-21

## 2025-05-21 RX ORDER — FLASH GLUCOSE SENSOR
KIT MISCELLANEOUS
Qty: 6 EACH | Refills: 0 | OUTPATIENT
Start: 2025-05-21

## 2025-05-21 NOTE — TELEPHONE ENCOUNTER
PA for (Dexcom G7 ) SUBMITTED to [employer]    via    [x]CMM-KEY: [id number]  []Surescripts-Case ID #   []Availity-Auth ID # NDC #   []Faxed to plan   []Other website   []Phone call Case ID #     []PA sent as URGENT    All office notes, labs and other pertaining documents and studies sent. Clinical questions answered. Awaiting determination from insurance company.     Turnaround time for your insurance to make a decision on your Prior Authorization can take 7-21 business days.

## 2025-05-21 NOTE — TELEPHONE ENCOUNTER
PA for (Dexcom G7 Sensor) SUBMITTED to [employer]    via    [x]CMM-KEY: [id number]  []Surescripts-Case ID #   []Availity-Auth ID # NDC #   []Faxed to plan   []Other website   []Phone call Case ID #     []PA sent as URGENT    All office notes, labs and other pertaining documents and studies sent. Clinical questions answered. Awaiting determination from insurance company.     Turnaround time for your insurance to make a decision on your Prior Authorization can take 7-21 business days.

## 2025-05-22 NOTE — TELEPHONE ENCOUNTER
PA for (Dexcom G7 Sensor) DENIED    Reason:(Screenshot if applicable)        Message sent to office clinical pool Yes    Denial letter scanned into Media Yes    We can gladly do an appeal but the process can take about 30-60 days to provide determination. Please have the office staff schedule a Peer to Peer at phone [phone] . If an appeal is truly warranted please have Provider send clinical documentation to the PA department to support the appeal.     **Please follow up with your patient regarding denial and next steps**

## 2025-05-22 NOTE — TELEPHONE ENCOUNTER
PA for (Dexcom G7 ) DENIED    Reason:(Screenshot if applicable)        Message sent to office clinical pool Yes    Denial letter scanned into Media Yes    We can gladly do an appeal but the process can take about 30-60 days to provide determination. Please have the office staff schedule a Peer to Peer at phone [phone] . If an appeal is truly warranted please have Provider send clinical documentation to the PA department to support the appeal.     **Please follow up with your patient regarding denial and next steps**

## 2025-05-23 NOTE — TELEPHONE ENCOUNTER
I think got these from [employer] in the past.  See if that is the case, or okay to start appeal.  Not sure what we do there.

## 2025-05-23 NOTE — TELEPHONE ENCOUNTER
Sent patient [employer] message to see if he has got these from [employer] in past, if not we will submit an appeal.

## 2025-05-27 DIAGNOSIS — E11.8 TYPE 2 DIABETES MELLITUS WITH COMPLICATION, WITHOUT LONG-TERM CURRENT USE OF INSULIN (HCC): Chronic | ICD-10-CM

## 2025-05-28 DIAGNOSIS — E78.2 MIXED HYPERLIPIDEMIA: ICD-10-CM

## 2025-05-28 DIAGNOSIS — E11.65 TYPE 2 DIABETES MELLITUS WITH HYPERGLYCEMIA, WITHOUT LONG-TERM CURRENT USE OF INSULIN (HCC): Primary | ICD-10-CM

## 2025-05-28 DIAGNOSIS — R35.1 BPH ASSOCIATED WITH NOCTURIA: ICD-10-CM

## 2025-05-28 DIAGNOSIS — Z12.5 SCREENING FOR PROSTATE CANCER: ICD-10-CM

## 2025-05-28 DIAGNOSIS — E03.8 SUBCLINICAL HYPOTHYROIDISM: ICD-10-CM

## 2025-05-28 DIAGNOSIS — I10 ESSENTIAL HYPERTENSION: ICD-10-CM

## 2025-05-28 DIAGNOSIS — N40.1 BPH ASSOCIATED WITH NOCTURIA: ICD-10-CM

## 2025-05-28 RX ORDER — SEMAGLUTIDE 2.68 MG/ML
INJECTION, SOLUTION SUBCUTANEOUS
Qty: 3 ML | Refills: 0 | Status: SHIPPED | OUTPATIENT
Start: 2025-05-28

## 2025-05-28 NOTE — TELEPHONE ENCOUNTER
Patient needs updated blood work and has previously placed orders. Please contact patient to go for labs. Courtesy refill provided.    Patient needs an appointment. Please contact the patient to schedule an appointment. Last office visit: -2024, courtesy refill given

## 2025-05-29 ENCOUNTER — APPOINTMENT (OUTPATIENT)
Dept: LAB | Facility: CLINIC | Age: 55
End: 2025-05-29
Payer: COMMERCIAL

## 2025-05-29 DIAGNOSIS — Z12.5 SCREENING FOR PROSTATE CANCER: ICD-10-CM

## 2025-05-29 DIAGNOSIS — E78.2 MIXED HYPERLIPIDEMIA: ICD-10-CM

## 2025-05-29 DIAGNOSIS — E11.65 TYPE 2 DIABETES MELLITUS WITH HYPERGLYCEMIA, WITHOUT LONG-TERM CURRENT USE OF INSULIN (HCC): ICD-10-CM

## 2025-05-29 DIAGNOSIS — E03.8 SUBCLINICAL HYPOTHYROIDISM: ICD-10-CM

## 2025-05-29 DIAGNOSIS — R35.1 BPH ASSOCIATED WITH NOCTURIA: ICD-10-CM

## 2025-05-29 DIAGNOSIS — N40.1 BPH ASSOCIATED WITH NOCTURIA: ICD-10-CM

## 2025-05-29 DIAGNOSIS — I10 ESSENTIAL HYPERTENSION: ICD-10-CM

## 2025-05-29 LAB
ALBUMIN SERPL BCG-MCNC: 4.2 G/DL (ref 3.5–5)
ALP SERPL-CCNC: 139 U/L (ref 34–104)
ALT SERPL W P-5'-P-CCNC: 70 U/L (ref 7–52)
ANION GAP SERPL CALCULATED.3IONS-SCNC: 10 MMOL/L (ref 4–13)
AST SERPL W P-5'-P-CCNC: 62 U/L (ref 13–39)
BASOPHILS # BLD AUTO: 0.07 THOUSANDS/ÂΜL (ref 0–0.1)
BASOPHILS NFR BLD AUTO: 1 % (ref 0–1)
BILIRUB SERPL-MCNC: 0.47 MG/DL (ref 0.2–1)
BUN SERPL-MCNC: 14 MG/DL (ref 5–25)
CALCIUM SERPL-MCNC: 9.3 MG/DL (ref 8.4–10.2)
CHLORIDE SERPL-SCNC: 100 MMOL/L (ref 96–108)
CHOLEST SERPL-MCNC: 174 MG/DL (ref ?–200)
CO2 SERPL-SCNC: 27 MMOL/L (ref 21–32)
CREAT SERPL-MCNC: 0.76 MG/DL (ref 0.6–1.3)
CREAT UR-MCNC: 67.3 MG/DL
EOSINOPHIL # BLD AUTO: 0.13 THOUSAND/ÂΜL (ref 0–0.61)
EOSINOPHIL NFR BLD AUTO: 2 % (ref 0–6)
ERYTHROCYTE [DISTWIDTH] IN BLOOD BY AUTOMATED COUNT: 13.6 % (ref 11.6–15.1)
EST. AVERAGE GLUCOSE BLD GHB EST-MCNC: 200 MG/DL
GFR SERPL CREATININE-BSD FRML MDRD: 103 ML/MIN/1.73SQ M
GLUCOSE P FAST SERPL-MCNC: 191 MG/DL (ref 65–99)
HBA1C MFR BLD: 8.6 %
HCT VFR BLD AUTO: 52.4 % (ref 36.5–49.3)
HDLC SERPL-MCNC: 28 MG/DL
HGB BLD-MCNC: 17.1 G/DL (ref 12–17)
IMM GRANULOCYTES # BLD AUTO: 0.07 THOUSAND/UL (ref 0–0.2)
IMM GRANULOCYTES NFR BLD AUTO: 1 % (ref 0–2)
LDLC SERPL DIRECT ASSAY-MCNC: 88 MG/DL (ref 0–100)
LYMPHOCYTES # BLD AUTO: 1.39 THOUSANDS/ÂΜL (ref 0.6–4.47)
LYMPHOCYTES NFR BLD AUTO: 17 % (ref 14–44)
MCH RBC QN AUTO: 30.8 PG (ref 26.8–34.3)
MCHC RBC AUTO-ENTMCNC: 32.6 G/DL (ref 31.4–37.4)
MCV RBC AUTO: 94 FL (ref 82–98)
MICROALBUMIN UR-MCNC: 1543.5 MG/L
MICROALBUMIN/CREAT 24H UR: 2293 MG/G CREATININE (ref 0–30)
MONOCYTES # BLD AUTO: 0.68 THOUSAND/ÂΜL (ref 0.17–1.22)
MONOCYTES NFR BLD AUTO: 8 % (ref 4–12)
NEUTROPHILS # BLD AUTO: 5.73 THOUSANDS/ÂΜL (ref 1.85–7.62)
NEUTS SEG NFR BLD AUTO: 71 % (ref 43–75)
NRBC BLD AUTO-RTO: 0 /100 WBCS
PLATELET BLD QL SMEAR: ABNORMAL
POTASSIUM SERPL-SCNC: 4.5 MMOL/L (ref 3.5–5.3)
PROT SERPL-MCNC: 7.7 G/DL (ref 6.4–8.4)
PSA SERPL-MCNC: 0.06 NG/ML (ref 0–4)
RBC # BLD AUTO: 5.55 MILLION/UL (ref 3.88–5.62)
RBC MORPH BLD: NORMAL
SODIUM SERPL-SCNC: 137 MMOL/L (ref 135–147)
T4 FREE SERPL-MCNC: 0.69 NG/DL (ref 0.61–1.12)
TRIGL SERPL-MCNC: 463 MG/DL (ref ?–150)
TSH SERPL DL<=0.05 MIU/L-ACNC: 7.86 UIU/ML (ref 0.45–4.5)
WBC # BLD AUTO: 8.07 THOUSAND/UL (ref 4.31–10.16)

## 2025-05-29 PROCEDURE — 83036 HEMOGLOBIN GLYCOSYLATED A1C: CPT

## 2025-05-29 PROCEDURE — 83721 ASSAY OF BLOOD LIPOPROTEIN: CPT

## 2025-05-29 PROCEDURE — 80053 COMPREHEN METABOLIC PANEL: CPT

## 2025-05-29 PROCEDURE — 82570 ASSAY OF URINE CREATININE: CPT

## 2025-05-29 PROCEDURE — G0103 PSA SCREENING: HCPCS

## 2025-05-29 PROCEDURE — 84443 ASSAY THYROID STIM HORMONE: CPT

## 2025-05-29 PROCEDURE — 36415 COLL VENOUS BLD VENIPUNCTURE: CPT

## 2025-05-29 PROCEDURE — 80061 LIPID PANEL: CPT

## 2025-05-29 PROCEDURE — 82043 UR ALBUMIN QUANTITATIVE: CPT

## 2025-05-29 PROCEDURE — 84439 ASSAY OF FREE THYROXINE: CPT

## 2025-05-29 PROCEDURE — 85025 COMPLETE CBC W/AUTO DIFF WBC: CPT

## 2025-05-30 ENCOUNTER — RESULTS FOLLOW-UP (OUTPATIENT)
Dept: FAMILY MEDICINE CLINIC | Facility: CLINIC | Age: 55
End: 2025-05-30

## 2025-05-30 DIAGNOSIS — I10 ESSENTIAL HYPERTENSION: ICD-10-CM

## 2025-05-30 DIAGNOSIS — E03.9 ACQUIRED HYPOTHYROIDISM: Primary | ICD-10-CM

## 2025-05-30 DIAGNOSIS — F41.9 ANXIETY: ICD-10-CM

## 2025-05-30 DIAGNOSIS — E11.69 TYPE 2 DIABETES MELLITUS WITH OTHER SPECIFIED COMPLICATION, UNSPECIFIED WHETHER LONG TERM INSULIN USE (HCC): ICD-10-CM

## 2025-05-30 RX ORDER — EMPAGLIFLOZIN 25 MG/1
25 TABLET, FILM COATED ORAL EVERY MORNING
Qty: 90 TABLET | Refills: 0 | Status: SHIPPED | OUTPATIENT
Start: 2025-05-30

## 2025-05-30 RX ORDER — FUROSEMIDE 80 MG/1
80 TABLET ORAL DAILY
Qty: 90 TABLET | Refills: 0 | Status: SHIPPED | OUTPATIENT
Start: 2025-05-30

## 2025-05-30 RX ORDER — LEVOTHYROXINE SODIUM 25 UG/1
25 TABLET ORAL
Qty: 100 TABLET | Refills: 3 | Status: SHIPPED | OUTPATIENT
Start: 2025-05-30

## 2025-06-11 DIAGNOSIS — E11.8 TYPE 2 DIABETES MELLITUS WITH COMPLICATION, WITHOUT LONG-TERM CURRENT USE OF INSULIN (HCC): Chronic | ICD-10-CM

## 2025-06-11 RX ORDER — SITAGLIPTIN AND METFORMIN HYDROCHLORIDE 1000; 50 MG/1; MG/1
TABLET, FILM COATED, EXTENDED RELEASE ORAL
Qty: 180 TABLET | Refills: 0 | Status: SHIPPED | OUTPATIENT
Start: 2025-06-11

## 2025-06-22 DIAGNOSIS — E11.8 TYPE 2 DIABETES MELLITUS WITH COMPLICATION, WITHOUT LONG-TERM CURRENT USE OF INSULIN (HCC): Chronic | ICD-10-CM

## 2025-06-23 ENCOUNTER — VBI (OUTPATIENT)
Dept: ADMINISTRATIVE | Facility: OTHER | Age: 55
End: 2025-06-23

## 2025-06-23 RX ORDER — SEMAGLUTIDE 2.68 MG/ML
INJECTION, SOLUTION SUBCUTANEOUS
Qty: 3 ML | Refills: 1 | Status: SHIPPED | OUTPATIENT
Start: 2025-06-23

## 2025-06-23 NOTE — TELEPHONE ENCOUNTER
06/23/25 1:04 PM     Chart reviewed for CRC: Colonoscopy ; nothing is submitted to the patient's insurance at this time.     Cassie Starks MA   [facility]

## 2025-07-21 ENCOUNTER — OFFICE VISIT (OUTPATIENT)
Dept: FAMILY MEDICINE CLINIC | Facility: CLINIC | Age: 55
End: 2025-07-21
Payer: COMMERCIAL

## 2025-07-21 VITALS
TEMPERATURE: 97.6 F | HEIGHT: 69 IN | DIASTOLIC BLOOD PRESSURE: 80 MMHG | WEIGHT: 265.4 LBS | HEART RATE: 67 BPM | SYSTOLIC BLOOD PRESSURE: 134 MMHG | BODY MASS INDEX: 39.31 KG/M2 | OXYGEN SATURATION: 97 %

## 2025-07-21 DIAGNOSIS — E11.65 TYPE 2 DIABETES MELLITUS WITH HYPERGLYCEMIA, WITHOUT LONG-TERM CURRENT USE OF INSULIN (HCC): Primary | ICD-10-CM

## 2025-07-21 DIAGNOSIS — E66.01 TYPE 2 DIABETES MELLITUS WITH MORBID OBESITY (HCC): ICD-10-CM

## 2025-07-21 DIAGNOSIS — E03.9 ACQUIRED HYPOTHYROIDISM: ICD-10-CM

## 2025-07-21 DIAGNOSIS — E11.69 TYPE 2 DIABETES MELLITUS WITH MORBID OBESITY (HCC): ICD-10-CM

## 2025-07-21 DIAGNOSIS — E78.2 MIXED HYPERLIPIDEMIA: ICD-10-CM

## 2025-07-21 DIAGNOSIS — G47.33 OBSTRUCTIVE SLEEP APNEA SYNDROME: ICD-10-CM

## 2025-07-21 DIAGNOSIS — E66.01 CLASS 2 SEVERE OBESITY DUE TO EXCESS CALORIES WITH SERIOUS COMORBIDITY AND BODY MASS INDEX (BMI) OF 39.0 TO 39.9 IN ADULT (HCC): ICD-10-CM

## 2025-07-21 DIAGNOSIS — E66.812 CLASS 2 SEVERE OBESITY DUE TO EXCESS CALORIES WITH SERIOUS COMORBIDITY AND BODY MASS INDEX (BMI) OF 39.0 TO 39.9 IN ADULT (HCC): ICD-10-CM

## 2025-07-21 DIAGNOSIS — F17.210 SMOKING GREATER THAN 20 PACK YEARS: ICD-10-CM

## 2025-07-21 DIAGNOSIS — I10 ESSENTIAL HYPERTENSION: ICD-10-CM

## 2025-07-21 PROCEDURE — 99396 PREV VISIT EST AGE 40-64: CPT | Performed by: INTERNAL MEDICINE

## 2025-07-21 PROCEDURE — 83036 HEMOGLOBIN GLYCOSYLATED A1C: CPT | Performed by: INTERNAL MEDICINE

## 2025-07-21 RX ORDER — GLIPIZIDE 5 MG/1
5 TABLET ORAL
Qty: 15 TABLET | Refills: 3 | Status: SHIPPED | OUTPATIENT
Start: 2025-07-21

## 2025-07-21 RX ORDER — GLIPIZIDE 5 MG/1
5 TABLET ORAL
Qty: 100 TABLET | Refills: 3 | Status: SHIPPED | OUTPATIENT
Start: 2025-07-21 | End: 2025-07-21 | Stop reason: SDUPTHER

## 2025-07-21 RX ORDER — ACYCLOVIR 400 MG/1
1 TABLET ORAL ONCE
Qty: 1 EACH | Refills: 0 | Status: SHIPPED | OUTPATIENT
Start: 2025-07-21 | End: 2025-07-21

## 2025-07-21 RX ORDER — ACYCLOVIR 400 MG/1
1 TABLET ORAL
Qty: 9 EACH | Refills: 3 | Status: SHIPPED | OUTPATIENT
Start: 2025-07-21

## 2025-07-21 NOTE — ASSESSMENT & PLAN NOTE
{If prescribing weight loss medication, click here to fill out prior auth smartform and then hit F2 with this smartlist to insert prior auth documentation (Optional):[id number]}

## 2025-07-21 NOTE — ASSESSMENT & PLAN NOTE
Has managed to lose about 100 pounds, over several years time.  BMI still remains at 39.  Lab Results   Component Value Date    HGBA1C 8.6 (A) 07/22/2025

## 2025-07-21 NOTE — ASSESSMENT & PLAN NOTE
Continue to be noncompliant with CPAP because he did not sleep well with it.  Consider reevaluation by sleep medicine.  Might also consider changing his Ozempic to Mounjaro for possible greater weight reduction.

## 2025-07-21 NOTE — ASSESSMENT & PLAN NOTE
Continue current medical therapy  Orders:  •  CBC; Future  •  Comprehensive metabolic panel; Future

## 2025-07-21 NOTE — PROGRESS NOTES
Adult Annual Physical  Name: Fidencio Forrester      : 1970      MRN: [id number]  Encounter Provider: Bossman Larson DO  Encounter Date: 2025   Encounter department: [facility]    :  Assessment & Plan  Type 2 diabetes mellitus with hyperglycemia, without long-term current use of insulin (HCC)  A1c today remains in the mid 8 range.  Her to have increased his glipizide from 10 mg morning 2 point 5 at night to 10 mg morning and 5 mg at night.  He will try to start dietarily adjusting his lifestyle, certainly eats carbohydrates to excess every day as his wife states that is pretty much all she cooks around the house.  Will try to go low carbohydrate diet and monitor sugars.  For the same reason he has had trouble getting his glucose sensors, have changed him to Dexcom 7 which seems to be covered by his insurance.  He is very timid when it comes to checking his fingers.  He did very well and was much more compliant with the former CGM.    Will check insulin and C-peptide with next labs in September.  Lab Results   Component Value Date    HGBA1C 8.6 (A) 2025       Orders:  •  Continuous Glucose Sensor (Dexcom G7 Sensor); Use 1 Device every 10 days  •  Continuous Glucose  (Dexcom G7 ) ARTEM; Use 1 each 1 (one) time for 1 dose  •  C-peptide; Future  •  POCT hemoglobin A1c  •  glipiZIDE (GLUCOTROL) 5 mg tablet; Take 1 tablet (5 mg total) by mouth with evening meal    Type 2 diabetes mellitus with morbid obesity (HCC)  Has managed to lose about 100 pounds, over several years time.  BMI still remains at 39.  Lab Results   Component Value Date    HGBA1C 8.6 (A) 2025            Class 2 severe obesity due to excess calories with serious comorbidity and body mass index (BMI) of 39.0 to 39.9 in adult (HCC)    Patient remains on Ozempic at maximal dose.       Obstructive sleep apnea syndrome  Continue to be noncompliant with CPAP because he did not sleep well with  it.  Consider reevaluation by sleep medicine.  Might also consider changing his Ozempic to Mounjaro for possible greater weight reduction.       Smoking greater than 20 pack years  Agrees to LDCT  Orders:  •  CT Lung Screening Program; Future    Mixed hyperlipidemia  Remains on high intensity statin  Orders:  •  Lipid Panel with Direct LDL reflex; Future    Acquired hypothyroidism  Remains on thyroid replacement and periodic monitoring  Orders:  •  TSH, 3rd generation; Future  •  T4, free; Future    Essential hypertension  Continue current medical therapy  Orders:  •  CBC; Future  •  Comprehensive metabolic panel; Future        Preventive Screenings:  - Diabetes Screening: screening not indicated, has diabetes and risks/benefits discussed  - Cholesterol Screening: screening not indicated and has hyperlipidemia   - Hepatitis C screening: screening up-to-date   - HIV screening: screening up-to-date   - Colon cancer screening: risks/benefits discussed and patient declines   - Lung cancer screening: risks/benefits discussed and orders placed   - Prostate cancer screening: screening up-to-date     Immunizations:  - Immunizations due: Prevnar 20, Tdap and Zoster (Shingrix)    Counseling/Anticipatory Guidance:    - Diet: discussed recommendations for a healthy/well-balanced diet.   - Exercise: the importance of regular exercise/physical activity was discussed. Recommend exercise 3-5 times per week for at least 30 minutes.       Depression Screening and Follow-up Plan: Patient was screened for depression during today's encounter. They screened negative with a PHQ-2 score of 2.      Tobacco Cessation Counseling: Tobacco cessation counseling was provided. The patient is sincerely urged to quit consumption of tobacco. He is not ready to quit tobacco. Medication options and side effects of medication discussed. Patient refused medication.     Lung Cancer Screening Shared Decision Making: I discussed with him that he is a  candidate for lung cancer CT screening.     The following Shared Decision-Making points were covered:  Benefits of screening were discussed, including the rates of reduction in death from lung cancer and other causes.  Harms of screening were reviewed, including false positive tests, radiation exposure levels, risks of invasive procedures, risks of complications of screening, and risk of overdiagnosis.  I counseled on the importance of adherence to annual lung cancer LDCT screening, impact of co-morbidities, and ability or willingness to undergo diagnosis and treatment.  I counseled on the importance of maintaining abstinence as a former smoker or was counseled on the importance of smoking cessation if a current smoker    Review of Eligibility Criteria: He meets all of the criteria for Lung Cancer Screening.   - He is 55 y.o.   - He has 20 pack year tobacco history and is a current smoker or has quit within the past 15 years  - He presents no signs or symptoms of lung cancer    After discussion, the patient decided to elect lung cancer screening.        History of Present Illness     Adult Annual Physical:  Patient presents for annual physical. Patient is here for annual physical.  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  No time for himself.  Denies chest pain, shortness of breath, nausea or vomiting.  His bowels and bladder have been doing okay.  He here mostly because he has been unable to get his CGM.  He is reluctant to do fingerstick sugars, and his A1c has been elevated.  He is on pretty much every medication he can have to try to prevent insulin which she has been very resistant to.  Remains resistant to the insulin dosing.  Has lost a significant amount of weight, almost 100 pounds over the last 4 to 5 years.  Continues to smoke half pack to a pack a day.  Depends on the stress level.  Due to see the eye doctor and has not been  "there.  Iris exams have been tried here on 3 occasions with failure.  Refuses any colonoscopy screening.  He again is adopted so we have no idea family history..     Diet and Physical Activity:  - Diet/Nutrition: diabetic diet and low carb diet.  - Exercise: no formal exercise.    Depression Screening:  - PHQ-2 Score: 2    General Health:  - Sleep: sleeps well and 7-8 hours of sleep on average.  - Hearing: normal hearing bilateral ears.  - Vision: most recent eye exam < 1 year ago and no vision problems.  - Dental: regular dental visits and brushes teeth twice daily.     Health:  - History of STDs: no.   - Urinary symptoms: none.     Advanced Care Planning:  - Has an advanced directive?: no    - Has a durable medical POA?: no    - ACP document given to patient?: no      Review of Systems   Constitutional:  Negative for chills and fever.   HENT:  Negative for ear pain and sore throat.    Eyes:  Negative for pain and visual disturbance.   Respiratory:  Positive for cough and wheezing. Negative for shortness of breath.    Cardiovascular:  Negative for chest pain and palpitations.   Gastrointestinal:  Negative for abdominal pain and vomiting.   Genitourinary:  Negative for dysuria and hematuria.   Musculoskeletal:  Negative for arthralgias and back pain.   Skin:  Negative for color change and rash.   Neurological:  Negative for seizures and syncope.   All other systems reviewed and are negative.        Objective   /80 (BP Location: Left arm, Patient Position: Sitting, Cuff Size: Adult)   Pulse 67   Temp 97.6 °F (36.4 °C) (Tympanic)   Ht 5' 8.5\" (1.74 m)   Wt 120 kg (265 lb 6.4 oz)   SpO2 97%   BMI 39.77 kg/m²     Physical Exam  Vitals and nursing note reviewed.   Constitutional:       General: He is not in acute distress.     Appearance: He is well-developed. He is obese.   HENT:      Head: Normocephalic and atraumatic.     Eyes:      Conjunctiva/sclera: Conjunctivae normal.       Cardiovascular:      Rate " and Rhythm: Normal rate and regular rhythm.      Pulses: Normal pulses.      Heart sounds: Murmur heard.   Pulmonary:      Effort: Pulmonary effort is normal. No respiratory distress.      Breath sounds: Normal breath sounds.   Abdominal:      Palpations: Abdomen is soft.      Tenderness: There is no abdominal tenderness.     Musculoskeletal:         General: No swelling.      Cervical back: Neck supple.      Right lower leg: Edema present.      Left lower leg: Edema present.     Skin:     General: Skin is warm and dry.      Capillary Refill: Capillary refill takes less than 2 seconds.     Neurological:      General: No focal deficit present.      Mental Status: He is alert and oriented to person, place, and time.     Psychiatric:         Mood and Affect: Mood normal.

## 2025-07-21 NOTE — ASSESSMENT & PLAN NOTE
A1c today remains in the mid 8 range.  Her to have increased his glipizide from 10 mg morning 2 point 5 at night to 10 mg morning and 5 mg at night.  He will try to start dietarily adjusting his lifestyle, certainly eats carbohydrates to excess every day as his wife states that is pretty much all she cooks around the house.  Will try to go low carbohydrate diet and monitor sugars.  For the same reason he has had trouble getting his glucose sensors, have changed him to Dexcom 7 which seems to be covered by his insurance.  He is very timid when it comes to checking his fingers.  He did very well and was much more compliant with the former CGM.    Will check insulin and C-peptide with next labs in September.  Lab Results   Component Value Date    HGBA1C 8.6 (A) 07/22/2025       Orders:  •  Continuous Glucose Sensor (Dexcom G7 Sensor); Use 1 Device every 10 days  •  Continuous Glucose  (Dexcom G7 ) ARTEM; Use 1 each 1 (one) time for 1 dose  •  C-peptide; Future  •  POCT hemoglobin A1c  •  glipiZIDE (GLUCOTROL) 5 mg tablet; Take 1 tablet (5 mg total) by mouth with evening meal

## 2025-07-21 NOTE — ASSESSMENT & PLAN NOTE
Remains on thyroid replacement and periodic monitoring  Orders:  •  TSH, 3rd generation; Future  •  T4, free; Future

## 2025-07-22 ENCOUNTER — TELEPHONE (OUTPATIENT)
Age: 55
End: 2025-07-22

## 2025-07-22 DIAGNOSIS — E11.65 TYPE 2 DIABETES MELLITUS WITH HYPERGLYCEMIA, WITHOUT LONG-TERM CURRENT USE OF INSULIN (HCC): ICD-10-CM

## 2025-07-22 DIAGNOSIS — E11.8 TYPE 2 DIABETES MELLITUS WITH COMPLICATION, WITHOUT LONG-TERM CURRENT USE OF INSULIN (HCC): Chronic | ICD-10-CM

## 2025-07-22 LAB — SL AMB POCT HEMOGLOBIN AIC: 8.6 (ref ?–6.5)

## 2025-07-22 RX ORDER — GLIPIZIDE 5 MG/1
TABLET ORAL
Qty: 90 TABLET | Refills: 3 | Status: CANCELLED | OUTPATIENT
Start: 2025-07-22

## 2025-07-22 NOTE — TELEPHONE ENCOUNTER
PA for (Dexcom G7 ) SUBMITTED to [employer]    via    [x]CMM-KEY: [id number]  []Surescripts-Case ID #   []Availity-Auth ID # NDC #   []Faxed to plan   []Other website   []Phone call Case ID #     []PA sent as URGENT    All office notes, labs and other pertaining documents and studies sent. Clinical questions answered. Awaiting determination from insurance company.     Turnaround time for your insurance to make a decision on your Prior Authorization can take 7-21 business days.

## 2025-07-22 NOTE — TELEPHONE ENCOUNTER
PA for Dexcom G7 Sensor SUBMITTED to [employer]    via    [x]CMM-KEY: [id number]  []Surescripts-Case ID #   []Availity-Auth ID # NDC #   []Faxed to plan   []Other website   []Phone call Case ID #     []PA sent as URGENT    All office notes, labs and other pertaining documents and studies sent. Clinical questions answered. Awaiting determination from insurance company.     Turnaround time for your insurance to make a decision on your Prior Authorization can take 7-21 business days.

## 2025-07-29 NOTE — TELEPHONE ENCOUNTER
PA Appeal for Dexcom G7 Sensor DENIED    Reason:(Screenshot if applicable)        Message sent to office clinical pool Yes    Denial letter scanned into Media Yes    Reference # [id number]

## 2025-08-06 DIAGNOSIS — I10 ESSENTIAL HYPERTENSION: Chronic | ICD-10-CM

## 2025-08-07 RX ORDER — IRBESARTAN 300 MG/1
300 TABLET ORAL DAILY
Qty: 90 TABLET | Refills: 1 | Status: SHIPPED | OUTPATIENT
Start: 2025-08-07